# Patient Record
Sex: FEMALE | Race: WHITE | Employment: STUDENT | URBAN - METROPOLITAN AREA
[De-identification: names, ages, dates, MRNs, and addresses within clinical notes are randomized per-mention and may not be internally consistent; named-entity substitution may affect disease eponyms.]

---

## 2018-02-08 ENCOUNTER — RADIANT APPOINTMENT (OUTPATIENT)
Dept: GENERAL RADIOLOGY | Facility: CLINIC | Age: 18
End: 2018-02-08
Attending: FAMILY MEDICINE
Payer: COMMERCIAL

## 2018-02-08 ENCOUNTER — OFFICE VISIT (OUTPATIENT)
Dept: ORTHOPEDICS | Facility: CLINIC | Age: 18
End: 2018-02-08
Payer: COMMERCIAL

## 2018-02-08 VITALS
HEIGHT: 64 IN | DIASTOLIC BLOOD PRESSURE: 57 MMHG | WEIGHT: 127.6 LBS | SYSTOLIC BLOOD PRESSURE: 109 MMHG | BODY MASS INDEX: 21.78 KG/M2

## 2018-02-08 DIAGNOSIS — M25.561 ACUTE PAIN OF RIGHT KNEE: Primary | ICD-10-CM

## 2018-02-08 DIAGNOSIS — M25.561 ACUTE PAIN OF RIGHT KNEE: ICD-10-CM

## 2018-02-08 NOTE — MR AVS SNAPSHOT
After Visit Summary   2/8/2018    Kim Jenkins    MRN: [id number]           Patient Information     Date Of Birth          2000        Visit Information        Provider Department      2/8/2018 3:20 PM Alf Brown DO [facility]        Today's Diagnoses     Acute pain of right knee    -  1      Care Instructions    Anterior Knee pain    -There are many causes including trauma, history of dislocation or subluxation of knee cap but most often it is due to an imbalance of the thigh muscles or weak core muscles which cause irregular tracking of your kneecap on your thigh bone.  -People whose feet pronate (roll in) increase the outward pulling on the kneecap as well    SIGNS AND SYMPTOMS:  -Diffuse knee pain that worsens with stairs, squatting, prolonged sitting, jumping, and similar movements.  -Pain may be achy or sharp  -Stiffness with prolonged sitting  -Occasionally, giving way of the knee, grinding or a catching sensation    TREATMENT:  -regular exercise (biking, swimming, or elliptical are good for cardio)  -weight lifting/plyometrics are helpful but remember to keep your knees behind your toes (don't bend knee more than 90degrees)  -regular core exercises (yoga and pilates)  -arch supports may help during exercise until hips stronger to prevent ankle pronation  *over the counter semi-rigid brands include power step arch supports may be purchased at [employer], [employer] or internet  *custom made orthotics may be ordered by your physician if needed for prolonged treatment  -Stretching and strengthening therapy  -Ice 10-15 minutes after activity. (Ice cups for massage 5-7min)  -Ice and NSAIDs help decrease inflammation. A good anti-inflammatory NSAID medication is Alleve (220mg). Take 1-2 tabs twice daily with food until pain improves or minimum of 14 days, then as needed for pain. (1-2 tabs twice daily dosing is for  patients over 12 years old. Jose than 11 yo, check dose with doctor.)  -often component of hip weakness that leads to lower extremity (foot, ankle, shin, and knee) problems so a lot of focus will be on core strength and balance  - recommend yoga for core strengthening and stretching  -Perform exercises as instructed through handout or formal therapy if doing. Until then start with the following:  --Once released to increase activity, BE PATIENT!    Return to activity guidelines include:    If it hurts, please avoid activity    Start gradually and build up, wait 24 hours before increase intensity and time    Runnin min on treadmill (or somewhere you can get home easily from if you have to stop), start walk 4 min/run 1 min and Repeat 3 times. If pain free for 48 hours, increase to walk 3 min/run 2 min. Continue to increase as such as pain allows. If you develop pain, back off to previous pain-free level and wait 1 week before increasing again.    Follow-up in 6 weeks if not improved or sooner if further concern.    If problem flares again after resolved, restart icing, and exercises.      Standing hamstring stretch: Put the heel of the leg on your injured side on a stool about 15 inches high. Keep your leg straight. Lean forward, bending at the hips, until you feel a mild stretch in the back of your thigh. Make sure you don't roll your shoulders or bend at the waist when doing this or you will stretch your lower back instead of your leg. Hold the stretch for 15 to 30 seconds. Repeat 3 times.    Quadriceps stretch: Stand at an arm's length away from the wall with your injured side farthest from the wall. Facing straight ahead, brace yourself by keeping one hand against the wall. With your other hand, grasp the ankle on your injured side and pull your heel toward your buttocks. Don't arch or twist your back. Keep your knees together. Hold this stretch for 15 to 30 seconds.    Side-lying leg lift: Lie on your  uninjured side. Tighten the front thigh muscles on your injured leg and lift that leg 8 to 10 inches (20 to 25 centimeters) away from the other leg. Keep the leg straight and lower it slowly. Do 2 sets of 15.    Quad sets: Sit on the floor with your injured leg straight and your other leg bent. Press the back of the knee of your injured leg against the floor by tightening the muscles on the top of your thigh. Hold this position 10 seconds. Relax. Do 2 sets of 15.  Straight leg raise: Lie on your back with your legs straight out in front of you. Bend the knee on your uninjured side and place the foot flat on the floor. Tighten the thigh muscle on your injured side and lift your leg about 8 inches off the floor. Keep your leg straight and your thigh muscle tight. Slowly lower your leg back down to the floor. Do 2 sets of 15.    Clam exercise: Lie on your uninjured side with your hips and knees bent and feet together. Slowly raise your top leg toward the ceiling while keeping your heels touching each other. Hold for 2 seconds and lower slowly. Do 2 sets of 15 repetitions.    Wall squat with a ball: Stand with your back, shoulders, and head against a wall. Look straight ahead. Keep your shoulders relaxed and your feet 3 feet (90 centimeters) from the wall and shoulder's width apart. Place a soccer or basketball-sized ball behind your back. Keeping your back against the wall, slowly squat down to a 45-degree angle. Your thighs will not yet be parallel to the floor. Hold this position for 10 seconds and then slowly slide back up the wall. Repeat 10 times. Build up to 2 sets of 15.    Knee stabilization: Wrap a piece of elastic tubing around the ankle of your uninjured leg. Tie a knot in the other end of the tubing and close it in a door at about ankle height.  Stand facing the door on the leg without tubing (your injured leg) and bend your knee slightly, keeping your thigh muscles tight. Stay in this position while you  move the leg with the tubing (the uninjured leg) straight back behind you. Do 2 sets of 15.  Turn 90 degrees so the leg without tubing is closest to the door. Move the leg with tubing away from your body. Do 2 sets of 15.  Turn 90 degrees again so your back is to the door. Move the leg with tubing straight out in front of you. Do 2 sets of 15.  Turn your body 90 degrees again so the leg with tubing is closest to the door. Move the leg with tubing across your body. Do 2 sets of 15.  Hold onto a chair if you need help balancing. This exercise can be made more challenging by standing on a firm pillow or foam mat while you move the leg with tubing.    Resisted terminal knee extension: Make a loop with a piece of elastic tubing by tying a knot in both ends. Close the knot in a door at knee height. Step into the loop with your injured leg so the tubing is around the back of your knee. Lift the other foot off the ground and hold onto a chair for balance, if needed. Bend the knee with tubing about 45 degrees. Slowly straighten your leg, keeping your thigh muscle tight as you do this. Repeat 15 times. Do 2 sets of 15. If you need an easier way to do this, stand on both legs for better support while you do the exercise.    Standing calf stretch: Stand facing a wall with your hands on the wall at about eye level. Keep your injured leg back with your heel on the floor. Keep the other leg forward with the knee bent. Turn your back foot slightly inward (as if you were pigeon-toed). Slowly lean into the wall until you feel a stretch in the back of your calf. Hold the stretch for 15 to 30 seconds. Return to the starting position. Repeat 3 times. Do this exercise several times each day.    Step-up: Stand with the foot of your injured leg on a support 3 to 5 inches (8 to 13 centimeters) high --like a small step or block of wood. Keep your other foot flat on the floor. Shift your weight onto the injured leg on the support. Straighten  your injured leg as the other leg comes off the floor. Return to the starting position by bending your injured leg and slowly lowering your uninjured leg back to the floor. Do 2 sets of 15.    Iliotibial band stretch, side-bending: Cross one leg in front of the other leg and lean in the opposite direction from the front leg. Reach the arm on the side of the back leg over your head while you do this. Hold this position for 15 to 30 seconds. Return to the starting position. Repeat 3 times and then switch legs and repeat the exercise.  Developed by [employer].  Published by [employer].  Copyright  2014 [employer] and/or one of its subsidiaries. All rights reserved.                    Follow-ups after your visit        Who to contact     Please call your clinic at [phone] to:    Ask questions about your health    Make or cancel appointments    Discuss your medicines    Learn about your test results    Speak to your doctor            Additional Information About Your Visit        MyChart Information     1jiajie is an electronic gateway that provides easy, online access to your medical records. With 1jiajie, you can request a clinic appointment, read your test results, renew a prescription or communicate with your care team.     To sign up for 1jiajie visit the website at [email or web address]   You will be asked to enter the access code listed below, as well as some personal information. Please follow the directions to create your username and password.     Your access code is: [email or web address]  Expires: 2018  4:03 PM     Your access code will  in 90 days. If you need help or a new code, please contact your [facility] or call [phone] for assistance.        Care EveryWhere ID     This is your Care EveryWhere ID. This could be used by other organizations to access your [facility] medical records  [id number]        Your Vitals Were     Height BMI (Body Mass  "Index)                1.626 m (5' 4\") 21.9 kg/m2           Blood Pressure from Last 3 Encounters:   02/08/18 109/57    Weight from Last 3 Encounters:   02/08/18 57.9 kg (127 lb 9.6 oz) (51 %)*     * Growth percentiles are based on [facility] 2-20 Years data.               Primary Care Provider    None Specified       No primary provider on file.        Equal Access to Services     TRUEDesert Valley HospitalIVORY : Hadii aad ku hadasho Sojoeali, waaxda luqadaha, qaybta kaalmada adeegyada, dafne soto hayaan sam leonallyssavitaly woodn . So [facility] [phone].    ATENCIÓN: Si habla español, tiene a escobar disposición servicios gratuitos de asistencia lingüística. Llame al [phone].    We comply with applicable federal civil rights laws and [place] laws. We do not discriminate on the basis of race, color, national origin, age, disability, sex, sexual orientation, or gender identity.            Thank you!     Thank you for choosing [facility]  for your care. Our goal is always to provide you with excellent care. Hearing back from our patients is one way we can continue to improve our services. Please take a few minutes to complete the written survey that you may receive in the mail after your visit with us. Thank you!             Your Updated Medication List - Protect others around you: Learn how to safely use, store and throw away your medicines at www.disposemymeds.org.      Notice  As of 2/8/2018  4:03 PM    You have not been prescribed any medications.      "

## 2018-02-08 NOTE — PROGRESS NOTES
"CHIEF COMPLAINT:  New Patient (Right knee)       HISTORY OF PRESENT ILLNESS  Ms. Luis Fernando Jenkins is a pleasant 17 year old year old female who presents to clinic today with right knee pain.  Kim explains that she was playing ultimate frisbee about two weeks ago when she dove for [email or web address].  Landed on her right knee.  She recalls this type of trauma on two occasions during game.  Pain after this time while running.  Located medial side of right knee.  Still able to play but has continued \"soreness\".  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  Denies swelling.  Denies h/o patellar dislocation, knee injury or knee surgery.  No swelling. No locking, no giving way.    Additional history: as documented    MEDICAL HISTORY  There is no problem list on file for this patient.      No current outpatient prescriptions on file.       Allergies   Allergen Reactions     Sulfa Drugs Hives       No family history on file.    Additional medical/Social/Surgical histories reviewed in [employer] and updated as appropriate.     REVIEW OF SYSTEMS (2/8/2018)  CONSTITUTIONAL: Denies fever and weight loss  EYES: Denies acute vision changes  ENT: Denies hearing changes or difficulty swallowing  CARDIAC: Denies chest pain or edema  RESPIRATORY: Denies dyspnea, cough or wheeze  GASTROINTESTINAL: Denies abdominal pain, nausea, vomiting  MUSCULOSKELETAL: See HPI  SKIN: Denies any recent rash or lesion  NEUROLOGICAL: Denies numbness or focal weakness  PSYCHIATRIC: No history of psychiatric symptoms or problems  ENDOCRINE: Denies current diagnosis of diabetes  HEMATOLOGY: Denies episodes of easy bleeding      PHYSICAL EXAM  /57  Ht 1.626 m (5' 4\")  Wt 57.9 kg (127 lb 9.6 oz)  BMI 21.9 kg/m2    General  - normal appearance, in no obvious distress  CV  - normal popliteal pulse  Pulm  - normal respiratory pattern, non-labored  Musculoskeletal - knee  - stance: normal gait without limp, normal single leg squat, no " obvious leg length discrepancy  - inspection: no swelling or effusion, normal bone and joint alignment, no obvious deformity  - palpation: no joint line tenderness, patella and patellar tendon non-tender.  Tenderness to palpation at distal quadricep musculature overlying VMO as well as some medial femoral condyle tenderness adjacent.  - ROM: 135 degrees flexion, -5 degrees extension, not painful, normal actively and passively compared to contralateral  - strength: 5/5 in flexion, 5/5 in extension  - special tests:  (-) Lachman  (-) anterior drawer  (-) posterior drawer  (-) pivot shift  (-) Leon  (-) Thessaly  (-) varus at 0 and 30 degrees flexion  (-) valgus at 0 and 30 degrees flexion  (-) Jhoan s compression test  (-) patellar apprehension    Neuro  - no sensory or motor deficit, grossly normal coordination, normal muscle tone  Skin  - no ecchymosis, erythema, warmth, or induration, no obvious rash  Psych  - interactive, appropriate, normal mood and affect    IMAGING : XR right knee. Final results and radiologist's interpretation, available in the [facility] health record. Images were reviewed with the patient/family members in the office today. My personal interpretation of the performed imaging is no acute osseous abnormality.  Normal joint spaces.     ASSESSMENT & PLAN  Ms. Luis Fernando Jenkins is a 17 year old year old female who presents to clinic today with right knee pain and residual soreness which occurred after contacting ground with right knee x 2 weeks ago.    Diagnosis:   Right knee contusion    -HEP provided  -Continue Ice, rest  -May continue knee sleeve for perceived support  -Ibuprofen  -Activity as tolerated  -F/u 2 weeks if persisting    It was a pleasure seeing Kim today.    Alf Brown DO, CAM  Primary Care Sports Medicine

## 2018-02-08 NOTE — LETTER
Date:February 12, 2018      Patient was self referred, no letter generated. Do not send.        [facility] Information

## 2018-02-08 NOTE — LETTER
2/8/2018       RE: Kim Jenkins  [address] [place] [address]     Dear Colleague,    Thank you for referring your patient, Kim Jenkins, to the [facility] at [facility]. Please see a copy of my visit note below.          SPORTS & ORTHOPEDIC WALK-IN VISIT 2/8/2018    Primary Care Physician:       2 weeks ago dove while playing ultimate Frisbee and hit knee on the ground twice. She noticed pain while running. She explains the pain as a deep pain on the medial right knee. She notices that she is sore while playing as well as after.  She plays ultimate 4 times a weeks.     Reason for visit:     What part of your body is injured / painful?  right knee    What caused the injury /pain? Fall    How long ago did your injury occur or pain begin? 2 week(s)    What are your most bothersome symptoms? Pain    How would you characterize your symptom?  aching, dull and sharp    What makes your symptoms better? Rest, Ice and Wrap or brace    What makes your symptoms worse? Walking, Playing sport and Other: running and stairs.    Have you been previously seen for this problem? No    Medical History:    Any recent changes to your medical history? No    Any new medication prescribed since last visit? No    Have you had surgery on this body part before? No    Medical History:     Medications: None    Allergies: Yes: Sulfa Drugs     Family History of Medical Problems: NA    Previous Surgeries: NA    Social History:    Occupation: Student at      Handedness: Right    Exercise: 3-4 days/week    Review of Systems:    Do you have fever, chills, weight loss? No    Do you have any vision problems? No    Do you have any chest pain or edema? No    Do you have any shortness of breath or wheezing?  No    Do you have stomach problems? No    Do you have any numbness or focal weakness? No    Do you have diabetes? No    Do you  "have problems with bleeding or clotting? No    Do you have an rashes or other skin lesions? No           CHIEF COMPLAINT:  New Patient (Right knee)       HISTORY OF PRESENT ILLNESS  Ms. Luis Fernando Jenikns is a pleasant 17 year old year old female who presents to clinic today with right knee pain.  Kim explains that she was playing ultimate frisbee about two weeks ago when she dove for [employer].  Landed on her right knee.  She recalls this type of trauma on two occasions during game.  Pain after this time while running.  Located medial side of right knee.  Still able to play but has continued \"soreness\".  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  Denies swelling.  Denies h/o patellar dislocation, knee injury or knee surgery.  No swelling. No locking, no giving way.    Additional history: as documented    MEDICAL HISTORY  There is no problem list on file for this patient.      No current outpatient prescriptions on file.       Allergies   Allergen Reactions     Sulfa Drugs Hives       No family history on file.    Additional medical/Social/Surgical histories reviewed in [employer] and updated as appropriate.     REVIEW OF SYSTEMS (2/8/2018)  CONSTITUTIONAL: Denies fever and weight loss  EYES: Denies acute vision changes  ENT: Denies hearing changes or difficulty swallowing  CARDIAC: Denies chest pain or edema  RESPIRATORY: Denies dyspnea, cough or wheeze  GASTROINTESTINAL: Denies abdominal pain, nausea, vomiting  MUSCULOSKELETAL: See HPI  SKIN: Denies any recent rash or lesion  NEUROLOGICAL: Denies numbness or focal weakness  PSYCHIATRIC: No history of psychiatric symptoms or problems  ENDOCRINE: Denies current diagnosis of diabetes  HEMATOLOGY: Denies episodes of easy bleeding      PHYSICAL EXAM  /57  Ht 1.626 m (5' 4\")  Wt 57.9 kg (127 lb 9.6 oz)  BMI 21.9 kg/m2    General  - normal appearance, in no obvious distress  CV  - normal popliteal pulse  Pulm  - normal respiratory " pattern, non-labored  Musculoskeletal - knee  - stance: normal gait without limp, normal single leg squat, no obvious leg length discrepancy  - inspection: no swelling or effusion, normal bone and joint alignment, no obvious deformity  - palpation: no joint line tenderness, patella and patellar tendon non-tender.  Tenderness to palpation at distal quadricep musculature overlying VMO as well as some medial femoral condyle tenderness adjacent.  - ROM: 135 degrees flexion, -5 degrees extension, not painful, normal actively and passively compared to contralateral  - strength: 5/5 in flexion, 5/5 in extension  - special tests:  (-) Lachman  (-) anterior drawer  (-) posterior drawer  (-) pivot shift  (-) Leon  (-) Thessaly  (-) varus at 0 and 30 degrees flexion  (-) valgus at 0 and 30 degrees flexion  (-) Jhoan s compression test  (-) patellar apprehension    Neuro  - no sensory or motor deficit, grossly normal coordination, normal muscle tone  Skin  - no ecchymosis, erythema, warmth, or induration, no obvious rash  Psych  - interactive, appropriate, normal mood and affect    IMAGING : XR right knee. Final results and radiologist's interpretation, available in the [facility] health record. Images were reviewed with the patient/family members in the office today. My personal interpretation of the performed imaging is no acute osseous abnormality.  Normal joint spaces.     ASSESSMENT & PLAN  Ms. Luis Fernando Jenkins is a 17 year old year old female who presents to clinic today with right knee pain and residual soreness which occurred after contacting ground with right knee x 2 weeks ago.    Diagnosis:   Right knee contusion    -HEP provided  -Continue Ice, rest  -May continue knee sleeve for perceived support  -Ibuprofen  -Activity as tolerated  -F/u 2 weeks if persisting    It was a pleasure seeing Kim today.    Alf Brown, DO, CAM  Primary Care Sports Medicine      Again, thank you for allowing me to participate in the  care of your patient.      Sincerely,    Alf Brown, DO

## 2018-02-08 NOTE — PATIENT INSTRUCTIONS
Anterior Knee pain    -There are many causes including trauma, history of dislocation or subluxation of knee cap but most often it is due to an imbalance of the thigh muscles or weak core muscles which cause irregular tracking of your kneecap on your thigh bone.  -People whose feet pronate (roll in) increase the outward pulling on the kneecap as well    SIGNS AND SYMPTOMS:  -Diffuse knee pain that worsens with stairs, squatting, prolonged sitting, jumping, and similar movements.  -Pain may be achy or sharp  -Stiffness with prolonged sitting  -Occasionally, giving way of the knee, grinding or a catching sensation    TREATMENT:  -regular exercise (biking, swimming, or elliptical are good for cardio)  -weight lifting/plyometrics are helpful but remember to keep your knees behind your toes (don't bend knee more than 90degrees)  -regular core exercises (yoga and pilates)  -arch supports may help during exercise until hips stronger to prevent ankle pronation  *over the counter semi-rigid brands include power step arch supports may be purchased at specialty shoe stores, [employer] or internet  *custom made orthotics may be ordered by your physician if needed for prolonged treatment  -Stretching and strengthening therapy  -Ice 10-15 minutes after activity. (Ice cups for massage 5-7min)  -Ice and NSAIDs help decrease inflammation. A good anti-inflammatory NSAID medication is Alleve (220mg). Take 1-2 tabs twice daily with food until pain improves or minimum of 14 days, then as needed for pain. (1-2 tabs twice daily dosing is for patients over 12 years old. Jose than 13 yo, check dose with doctor.)  -often component of hip weakness that leads to lower extremity (foot, ankle, shin, and knee) problems so a lot of focus will be on core strength and balance  - recommend yoga for core strengthening and stretching  -Perform exercises as instructed through handout or formal therapy if doing. Until then start with the  following:  --Once released to increase activity, BE PATIENT!    Return to activity guidelines include:    If it hurts, please avoid activity    Start gradually and build up, wait 24 hours before increase intensity and time    Runnin min on treadmill (or somewhere you can get home easily from if you have to stop), start walk 4 min/run 1 min and Repeat 3 times. If pain free for 48 hours, increase to walk 3 min/run 2 min. Continue to increase as such as pain allows. If you develop pain, back off to previous pain-free level and wait 1 week before increasing again.    Follow-up in 6 weeks if not improved or sooner if further concern.    If problem flares again after resolved, restart icing, and exercises.      Standing hamstring stretch: Put the heel of the leg on your injured side on a stool about 15 inches high. Keep your leg straight. Lean forward, bending at the hips, until you feel a mild stretch in the back of your thigh. Make sure you don't roll your shoulders or bend at the waist when doing this or you will stretch your lower back instead of your leg. Hold the stretch for 15 to 30 seconds. Repeat 3 times.    Quadriceps stretch: Stand at an arm's length away from the wall with your injured side farthest from the wall. Facing straight ahead, brace yourself by keeping one hand against the wall. With your other hand, grasp the ankle on your injured side and pull your heel toward your buttocks. Don't arch or twist your back. Keep your knees together. Hold this stretch for 15 to 30 seconds.    Side-lying leg lift: Lie on your uninjured side. Tighten the front thigh muscles on your injured leg and lift that leg 8 to 10 inches (20 to 25 centimeters) away from the other leg. Keep the leg straight and lower it slowly. Do 2 sets of 15.    Quad sets: Sit on the floor with your injured leg straight and your other leg bent. Press the back of the knee of your injured leg against the floor by tightening the muscles on the  top of your thigh. Hold this position 10 seconds. Relax. Do 2 sets of 15.  Straight leg raise: Lie on your back with your legs straight out in front of you. Bend the knee on your uninjured side and place the foot flat on the floor. Tighten the thigh muscle on your injured side and lift your leg about 8 inches off the floor. Keep your leg straight and your thigh muscle tight. Slowly lower your leg back down to the floor. Do 2 sets of 15.    Clam exercise: Lie on your uninjured side with your hips and knees bent and feet together. Slowly raise your top leg toward the ceiling while keeping your heels touching each other. Hold for 2 seconds and lower slowly. Do 2 sets of 15 repetitions.    Wall squat with a ball: Stand with your back, shoulders, and head against a wall. Look straight ahead. Keep your shoulders relaxed and your feet 3 feet (90 centimeters) from the wall and shoulder's width apart. Place a soccer or basketball-sized ball behind your back. Keeping your back against the wall, slowly squat down to a 45-degree angle. Your thighs will not yet be parallel to the floor. Hold this position for 10 seconds and then slowly slide back up the wall. Repeat 10 times. Build up to 2 sets of 15.    Knee stabilization: Wrap a piece of elastic tubing around the ankle of your uninjured leg. Tie a knot in the other end of the tubing and close it in a door at about ankle height.  Stand facing the door on the leg without tubing (your injured leg) and bend your knee slightly, keeping your thigh muscles tight. Stay in this position while you move the leg with the tubing (the uninjured leg) straight back behind you. Do 2 sets of 15.  Turn 90 degrees so the leg without tubing is closest to the door. Move the leg with tubing away from your body. Do 2 sets of 15.  Turn 90 degrees again so your back is to the door. Move the leg with tubing straight out in front of you. Do 2 sets of 15.  Turn your body 90 degrees again so the leg with  tubing is closest to the door. Move the leg with tubing across your body. Do 2 sets of 15.  Hold onto a chair if you need help balancing. This exercise can be made more challenging by standing on a firm pillow or foam mat while you move the leg with tubing.    Resisted terminal knee extension: Make a loop with a piece of elastic tubing by tying a knot in both ends. Close the knot in a door at knee height. Step into the loop with your injured leg so the tubing is around the back of your knee. Lift the other foot off the ground and hold onto a chair for balance, if needed. Bend the knee with tubing about 45 degrees. Slowly straighten your leg, keeping your thigh muscle tight as you do this. Repeat 15 times. Do 2 sets of 15. If you need an easier way to do this, stand on both legs for better support while you do the exercise.    Standing calf stretch: Stand facing a wall with your hands on the wall at about eye level. Keep your injured leg back with your heel on the floor. Keep the other leg forward with the knee bent. Turn your back foot slightly inward (as if you were pigeon-toed). Slowly lean into the wall until you feel a stretch in the back of your calf. Hold the stretch for 15 to 30 seconds. Return to the starting position. Repeat 3 times. Do this exercise several times each day.    Step-up: Stand with the foot of your injured leg on a support 3 to 5 inches (8 to 13 centimeters) high --like a small step or block of wood. Keep your other foot flat on the floor. Shift your weight onto the injured leg on the support. Straighten your injured leg as the other leg comes off the floor. Return to the starting position by bending your injured leg and slowly lowering your uninjured leg back to the floor. Do 2 sets of 15.    Iliotibial band stretch, side-bending: Cross one leg in front of the other leg and lean in the opposite direction from the front leg. Reach the arm on the side of the back leg over your head while you  do this. Hold this position for 15 to 30 seconds. Return to the starting position. Repeat 3 times and then switch legs and repeat the exercise.  Developed by [employer].  Published by [employer].  Copyright  2014 [employer] and/or one of its subsidiaries. All rights reserved.

## 2018-02-08 NOTE — PROGRESS NOTES
SPORTS & ORTHOPEDIC WALK-IN VISIT 2/8/2018    Primary Care Physician:       2 weeks ago dove while playing ultimate Frisbee and hit knee on the ground twice. She noticed pain while running. She explains the pain as a deep pain on the medial right knee. She notices that she is sore while playing as well as after.  She plays ultimate 4 times a weeks.     Reason for visit:     What part of your body is injured / painful?  right knee    What caused the injury /pain? Fall    How long ago did your injury occur or pain begin? 2 week(s)    What are your most bothersome symptoms? Pain    How would you characterize your symptom?  aching, dull and sharp    What makes your symptoms better? Rest, Ice and Wrap or brace    What makes your symptoms worse? Walking, Playing sport and Other: running and stairs.    Have you been previously seen for this problem? No    Medical History:    Any recent changes to your medical history? No    Any new medication prescribed since last visit? No    Have you had surgery on this body part before? No    Medical History:     Medications: None    Allergies: Yes: Sulfa Drugs     Family History of Medical Problems: NA    Previous Surgeries: NA    Social History:    Occupation: Student at [employer]     Handedness: Right    Exercise: 3-4 days/week    Review of Systems:    Do you have fever, chills, weight loss? No    Do you have any vision problems? No    Do you have any chest pain or edema? No    Do you have any shortness of breath or wheezing?  No    Do you have stomach problems? No    Do you have any numbness or focal weakness? No    Do you have diabetes? No    Do you have problems with bleeding or clotting? No    Do you have an rashes or other skin lesions? No

## 2018-09-12 ENCOUNTER — OFFICE VISIT (OUTPATIENT)
Dept: ORTHOPEDICS | Facility: CLINIC | Age: 18
End: 2018-09-12
Payer: COMMERCIAL

## 2018-09-12 VITALS
DIASTOLIC BLOOD PRESSURE: 62 MMHG | BODY MASS INDEX: 21.34 KG/M2 | SYSTOLIC BLOOD PRESSURE: 110 MMHG | WEIGHT: 125 LBS | HEIGHT: 64 IN

## 2018-09-12 DIAGNOSIS — S29.012A RHOMBOID MUSCLE STRAIN, INITIAL ENCOUNTER: Primary | ICD-10-CM

## 2018-09-12 RX ORDER — LEVONORGESTREL AND ETHINYL ESTRADIOL 0.1-0.02MG
KIT ORAL
COMMUNITY
Start: 2018-08-02

## 2018-09-12 RX ORDER — LEVONORGESTREL 1.5 MG/1
TABLET ORAL
COMMUNITY
Start: 2018-08-02

## 2018-09-12 RX ORDER — 1.1% SODIUM FLUORIDE PRESCRIPTION DENTAL CREAM 5 MG/G
CREAM DENTAL
COMMUNITY
Start: 2018-07-13

## 2018-09-12 RX ORDER — DICLOFENAC SODIUM 75 MG/1
75 TABLET, DELAYED RELEASE ORAL 2 TIMES DAILY
Qty: 20 TABLET | Refills: 0 | Status: SHIPPED | OUTPATIENT
Start: 2018-09-12

## 2018-09-12 NOTE — PROGRESS NOTES
"[facility]  Orthopedics  Alf rBown, DO  2018     Name: Kim Jenkins  MRN: [id number]  Age: 17 year old  : 2000  Referring provider: Referred Self     Chief Complaint: Shoulder and upper back pain    Date of Injury: 18    History of Present Illness:   Kim Jenkins is a 17 year old, right handed female who presents today for evaluation of her right shoulder and upper back pain. On 18, the patient woke up and felt an achy, muscle pain in her right shoulder blade. The pain occurs when siting, standing, turning a certain way, or when she is practicing [employer]. The pain has increased since then, and usually gets worse as the day goes on. She has tried massages, Biofreeze, rest, and heat to the area with some relief. She notes that it was her first night in a new bed, but does not recall any incident that would have caused her pain.    Review of Systems:   A 10-point review of systems was obtained and is negative except for as noted in the HPI.     Medications:      AUBRA 0.1-20 MG-MCG per tablet, , Disp: , Rfl:      SF 5000 PLUS 1.1 % CREA, , Disp: , Rfl:      ECONTRA EZ 1.5 MG TABS tablet, , Disp: , Rfl:     Allergies:  Sulfa drugs (hives)    Past Medical History:  The patient denies any significant past medical history.    Past Surgical History:  The patient does not have any pertinent past surgical history.    Social History:  Patient is a student and enjoys playing ultimate frisbee. She denies tobacco use and denies alcohol use.     Family History:  No past pertinent family history.    Physical Examination:  Blood pressure 110/62, height 1.626 m (5' 4\"), weight 56.7 kg (125 lb).  General  - normal appearance, in no obvious distress  CV  - normal radial pulse  Pulm  - normal respiratory pattern, non-labored  Musculoskeletal - shoulder  - inspection: hypertonicity of right trapezius muscle  - palpation: tenderness at right rhomboid, scapular border, mid belly, " and ropy-ness of right rhomboid  - ROM: terminal elevation pain  - strength: 5/5  strength, 5/5 in all shoulder planes  - special tests:  (-) Speed's  (-) Neer  (-) Hawkin's  (+) Misty's with pain isolated to rhomboid areas  (-) Chicot's  (-) apprehension  (-) subscap lift-off  Neuro  - no sensory or motor deficit, grossly normal coordination, normal muscle tone  Skin  - no ecchymosis, erythema, warmth, or induration, no obvious rash  Psych  - interactive, appropriate, normal mood and affect    Assessment:   17 year old, right 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 Clinical exam consistent with right rhomboid strain.      Plan:   - Tizanidine at bedtime   - Ibuprofen twice a day  - HEP provided, tennis ball massage  - Ice the area after Frisbee practice, Stretch /heat prior  - Follow up 3-4 weeks if persisting.    It was a pleasure seeing Kim today.    Alf Brown DO, [facility]  Primary Care Sports Medicine    I, Alf Brown DO, have reviewed the above note and agree with the scribe's notation as written.    Scribe Disclosure:   Murtaza CROSS, am serving as a scribe to document services personally performed by Alf Brown DO at this visit, based upon the provider's statements to me. All documentation has been reviewed by the aforementioned provider prior to being entered into the official medical record.

## 2018-09-12 NOTE — PROGRESS NOTES
SPORTS & ORTHOPEDIC WALK-IN VISIT 9/12/2018    Primary Care Physician:      Woke up last Tuesday and had pain in her right shoulder blade. The pain has increased now. The pain is worse when she is standing and sitting. The pain gets worse as the day goes on. She has been using a massage tool, heat, and biofreeze which helps temporarily helps. She notes it was her first night in a new bed, but does not recall any incident that would cause it.     Reason for visit:     What part of your body is injured / painful?  left upper back    What caused the injury /pain? No inciting event     How long ago did your injury occur or pain begin? several days ago    What are your most bothersome symptoms? Pain    How would you characterize your symptom?  aching and muscle pain     What makes your symptoms better? Rest, Heat and Other: biofreeze    What makes your symptoms worse? Standing and Sitting    Have you been previously seen for this problem? No    Medical History:    Any recent changes to your medical history? No    Any new medication prescribed since last visit? No    Have you had surgery on this body part before? No    Social History:    Occupation: Student     Handedness: Right    Exercise: Most days/week    Review of Systems:    Do you have fever, chills, weight loss? No    Do you have any vision problems? No    Do you have any chest pain or edema? No    Do you have any shortness of breath or wheezing?  No    Do you have stomach problems? No    Do you have any numbness or focal weakness? No    Do you have diabetes? No    Do you have problems with bleeding or clotting? No    Do you have an rashes or other skin lesions? No

## 2018-09-12 NOTE — PATIENT INSTRUCTIONS
Rhomboid Strain    WHAT IS A RHOMBOID STRAIN OR SPASM?    The rhomboid muscles in your upper back connect the inner edges of your shoulder blades to your spine. A rhomboid strain is a stretch or tear of these muscles. A rhomboid spasm is a sudden tightening of the muscle that you cannot control.    WHAT IS THE CAUSE?    A rhomboid muscle strain or spasm is usually caused by overuse of your shoulder and arm. This can happen from:    Overhead activities, like serving a tennis ball or reaching to put objects on a high shelf  Rowing  Carrying a heavy backpack, especially if you carry it over just one shoulder  Poor posture, especially while you are using a computer for a long time    WHAT ARE THE SYMPTOMS?    A strain causes pain in the upper back between your shoulder blade and your spine. A spasm feels like a knot or tightness in the muscle. You may have pain when you move your shoulders or when you breathe.    HOW IS IT DIAGNOSED?    Your healthcare provider will ask about your symptoms, activities, and medical history and examine you.    HOW IS IT TREATED?    You will need to change or stop doing the activities that cause pain until your muscles have healed. For example, you may need to run or ride a bicycle instead of playing tennis or rowing.    Your healthcare provider may recommend stretching and strengthening exercises and other types of physical therapy to help you heal.    A mild rhomboid strain may heal within a few weeks, but a severe injury may take 6 weeks or longer.    HOW CAN I HELP TAKE CARE OF MYSELF?    To help relieve swelling and pain:    Put an ice pack, gel pack, or package of frozen vegetables wrapped in a cloth on the injured area every 3 to 4 hours for up to 20 minutes at a time. You can lie down with your upper back against the ice.    Take pain medicine, such as acetaminophen, ibuprofen, or other medicine as directed by your provider. Nonsteroidal anti-inflammatory medicines (NSAIDs), such  as ibuprofen, may cause stomach bleeding and other problems. These risks increase with age. Read the label and take as directed. Unless recommended by your healthcare provider, do not take for more than 10 days.    Put moist heat on your back for up to 20 minutes at a time to help relax tight muscles or muscle spasms. Moist heat includes heat patches or moist heating pads that you can purchase at most drugstores, a wet washcloth or towel that has been heated in the dryer, or a hot shower. Don t use heat if you have swelling.    Do the exercises recommended by your healthcare provider. Massage is also very helpful. Here s a way to do a form of self-massage:    Put a tennis ball on the floor and lie down with your upper back against the ball.  Shift your position to gently roll the ball against your muscles.  You can also buy a foam roller or a self-massage tool.    Follow your healthcare provider's instructions. Ask your provider:    How and when you will hear your test results  How long it will take to recover  What activities you should avoid and when you can return to your normal activities  How to take care of yourself at home  What symptoms or problems you should watch for and what to do if you have them  Make sure you know when you should come back for a checkup.    HOW CAN I HELP PREVENT RHOMBOID MUSCLE STRAIN OR SPASM?    Here are some of the things you can do to help prevent rhomboid muscle strain or spasm:    Do warm-up exercises and stretching before activities to help prevent injuries.  When you work at a computer, take frequent breaks to stretch your neck and back.  Follow safety rules and use any protective equipment recommended for your work or sport. Practice good posture and good form!    Developed by [employer].  Published by [employer].  Copyright  2014 [employer] and/or one of its subsidiaries. All rights reserved.                            Rhomboid Strain Exercises    You may do all  of these exercises right away.    Pectoralis stretch:  an open doorway or corner with both hands slightly above your head on the door frame or wall. Slowly lean forward until you feel a stretch in the front of your shoulders. Hold 15 to 30 seconds. Repeat 3 times.  Thoracic extension: Sit in a chair and clasp both arms behind your head. Gently arch backward and look up toward the ceiling. Repeat 10 times. Do this several times each day.    Arm slide on wall: Sit or stand with your back against a wall and your elbows and wrists against the wall. Slowly slide your arms upward as high as you can while keeping your elbows and wrists against the wall. Do 2 sets of 8 to 12.  Scapular squeeze: While sitting or standing with your arms by your sides, squeeze your shoulder blades together and hold for 5 seconds. Do 2 sets of 15.    Mid-trap exercise: Lie on your stomach on a firm surface and place a folded pillow underneath your chest. Place your arms out straight to your sides with your elbows straight and thumbs toward the ceiling. Slowly raise your arms toward the ceiling as you squeeze your shoulder blades together. Lower slowly. Do 3 sets of 15. As the exercise gets easier to do, hold soup cans or small weights in your hands.    Thoracic stretch: Sit on the floor with your legs out straight in front of you. Hold your mid-thighs with your hands. Curl you head and neck toward your belly button. Hold for a count of 15. Repeat 3 times.    Thoracic side stretch: Sit on the floor with your legs out straight in front of you. To stretch your right upper back, point your right elbow and shoulder forward while twisting your trunk to the left. Hold for a count of 15. Repeat 3 times. To stretch your left upper back, point your left elbow and shoulder forward while twisting your trunk to the right. Hold for a count of 15. Repeat 3 times.    Rowing exercise: Close middle of elastic tubing in a door or wrap tubing around an  immovable object. Hold 1 end in each hand. Sit in a chair, bend your arms 90 degrees, and hold one end of the tubing in each hand. Keep your forearms vertical and your elbows at shoulder level and bent 90 degrees. Pull backward on the band and squeeze your shoulder blades together. Do 2 sets of 15.

## 2018-09-12 NOTE — MR AVS SNAPSHOT
After Visit Summary   9/12/2018    Kim Jenkins    MRN: [id number]           Patient Information     Date Of Birth          2000        Visit Information        Provider Department      9/12/2018 1:00 PM Alf Brown [facility]        Today's Diagnoses     Rhomboid muscle strain, initial encounter    -  1      Care Instructions    Rhomboid Strain    WHAT IS A RHOMBOID STRAIN OR SPASM?    The rhomboid muscles in your upper back connect the inner edges of your shoulder blades to your spine. A rhomboid strain is a stretch or tear of these muscles. A rhomboid spasm is a sudden tightening of the muscle that you cannot control.    WHAT IS THE CAUSE?    A rhomboid muscle strain or spasm is usually caused by overuse of your shoulder and arm. This can happen from:    Overhead activities, like serving a tennis ball or reaching to put objects on a high shelf  Rowing  Carrying a heavy backpack, especially if you carry it over just one shoulder  Poor posture, especially while you are using a computer for a long time    WHAT ARE THE SYMPTOMS?    A strain causes pain in the upper back between your shoulder blade and your spine. A spasm feels like a knot or tightness in the muscle. You may have pain when you move your shoulders or when you breathe.    HOW IS IT DIAGNOSED?    Your healthcare provider will ask about your symptoms, activities, and medical history and examine you.    HOW IS IT TREATED?    You will need to change or stop doing the activities that cause pain until your muscles have healed. For example, you may need to run or ride a bicycle instead of playing tennis or rowing.    Your healthcare provider may recommend stretching and strengthening exercises and other types of physical therapy to help you heal.    A mild rhomboid strain may heal within a few weeks, but a severe injury may take 6 weeks or longer.    HOW CAN I HELP TAKE CARE OF  MYSELF?    To help relieve swelling and pain:    Put an ice pack, gel pack, or package of frozen vegetables wrapped in a cloth on the injured area every 3 to 4 hours for up to 20 minutes at a time. You can lie down with your upper back against the ice.    Take pain medicine, such as acetaminophen, ibuprofen, or other medicine as directed by your provider. Nonsteroidal anti-inflammatory medicines (NSAIDs), such as ibuprofen, may cause stomach bleeding and other problems. These risks increase with age. Read the label and take as directed. Unless recommended by your healthcare provider, do not take for more than 10 days.    Put moist heat on your back for up to 20 minutes at a time to help relax tight muscles or muscle spasms. Moist heat includes heat patches or moist heating pads that you can purchase at most drugstores, a wet washcloth or towel that has been heated in the dryer, or a hot shower. Don t use heat if you have swelling.    Do the exercises recommended by your healthcare provider. Massage is also very helpful. Here s a way to do a form of self-massage:    Put a tennis ball on the floor and lie down with your upper back against the ball.  Shift your position to gently roll the ball against your muscles.  You can also buy a foam roller or a self-massage tool.    Follow your healthcare provider's instructions. Ask your provider:    How and when you will hear your test results  How long it will take to recover  What activities you should avoid and when you can return to your normal activities  How to take care of yourself at home  What symptoms or problems you should watch for and what to do if you have them  Make sure you know when you should come back for a checkup.    HOW CAN I HELP PREVENT RHOMBOID MUSCLE STRAIN OR SPASM?    Here are some of the things you can do to help prevent rhomboid muscle strain or spasm:    Do warm-up exercises and stretching before activities to help prevent injuries.  When you  work at a computer, take frequent breaks to stretch your neck and back.  Follow safety rules and use any protective equipment recommended for your work or sport. Practice good posture and good form!    Developed by [employer].  Published by [employer].  Copyright  2014 [employer] and/or one of its subsidiaries. All rights reserved.                            Rhomboid Strain Exercises    You may do all of these exercises right away.    Pectoralis stretch:  an open doorway or corner with both hands slightly above your head on the door frame or wall. Slowly lean forward until you feel a stretch in the front of your shoulders. Hold 15 to 30 seconds. Repeat 3 times.  Thoracic extension: Sit in a chair and clasp both arms behind your head. Gently arch backward and look up toward the ceiling. Repeat 10 times. Do this several times each day.    Arm slide on wall: Sit or stand with your back against a wall and your elbows and wrists against the wall. Slowly slide your arms upward as high as you can while keeping your elbows and wrists against the wall. Do 2 sets of 8 to 12.  Scapular squeeze: While sitting or standing with your arms by your sides, squeeze your shoulder blades together and hold for 5 seconds. Do 2 sets of 15.    Mid-trap exercise: Lie on your stomach on a firm surface and place a folded pillow underneath your chest. Place your arms out straight to your sides with your elbows straight and thumbs toward the ceiling. Slowly raise your arms toward the ceiling as you squeeze your shoulder blades together. Lower slowly. Do 3 sets of 15. As the exercise gets easier to do, hold soup cans or small weights in your hands.    Thoracic stretch: Sit on the floor with your legs out straight in front of you. Hold your mid-thighs with your hands. Curl you head and neck toward your belly button. Hold for a count of 15. Repeat 3 times.    Thoracic side stretch: Sit on the floor with your legs out straight in  "front of you. To stretch your right upper back, point your right elbow and shoulder forward while twisting your trunk to the left. Hold for a count of 15. Repeat 3 times. To stretch your left upper back, point your left elbow and shoulder forward while twisting your trunk to the right. Hold for a count of 15. Repeat 3 times.    Rowing exercise: Close middle of elastic tubing in a door or wrap tubing around an immovable object. Hold 1 end in each hand. Sit in a chair, bend your arms 90 degrees, and hold one end of the tubing in each hand. Keep your forearms vertical and your elbows at shoulder level and bent 90 degrees. Pull backward on the band and squeeze your shoulder blades together. Do 2 sets of 15.                    Follow-ups after your visit        Who to contact     Please call your clinic at [phone] to:    Ask questions about your health    Make or cancel appointments    Discuss your medicines    Learn about your test results    Speak to your doctor            Additional Information About Your Visit        Care EveryWhere ID     This is your Care EveryWhere ID. This could be used by other organizations to access your [facility] medical records  [id number]        Your Vitals Were     Height BMI (Body Mass Index)                1.626 m (5' 4\") 21.46 kg/m2           Blood Pressure from Last 3 Encounters:   09/12/18 110/62   02/08/18 109/57    Weight from Last 3 Encounters:   09/12/18 56.7 kg (125 lb) (44 %)*   02/08/18 57.9 kg (127 lb 9.6 oz) (51 %)*     * Growth percentiles are based on CDC 2-20 Years data.              Today, you had the following     No orders found for display         Today's Medication Changes          These changes are accurate as of 9/12/18  1:34 PM.  If you have any questions, ask your nurse or doctor.               Start taking these medicines.        Dose/Directions    diclofenac 75 MG EC tablet   Commonly known as:  VOLTAREN   Used for:  Rhomboid muscle strain, initial " encounter   Started by:  Alf Brown DO        Dose:  75 mg   Take 1 tablet (75 mg) by mouth 2 times daily   Quantity:  20 tablet   Refills:  0       tiZANidine 4 MG tablet   Commonly known as:  ZANAFLEX   Used for:  Rhomboid muscle strain, initial encounter   Started by:  Alf Brown DO        Dose:  4 mg   Take 1 tablet (4 mg) by mouth At Bedtime   Quantity:  10 tablet   Refills:  0            Where to get your medicines      These medications were sent to [facility] [phone]  [id number] [address]    Hours:  TRANSPLANT PHONE NUMBER [phone] Phone:  [phone]     diclofenac 75 MG EC tablet    tiZANidine 4 MG tablet                Primary Care Provider    None Specified       No primary provider on file.        Equal Access to Services     SARAH AVITIA : Rahel Rebolledo, china gonzalez, ron boggs, dafne elizabeth . So [facility] [phone].    ATENCIÓN: Si habla español, tiene a escobar disposición servicios gratuitos de asistencia lingüística. Llame al [phone].    We comply with applicable federal civil rights laws and [place] laws. We do not discriminate on the basis of race, color, national origin, age, disability, sex, sexual orientation, or gender identity.            Thank you!     Thank you for choosing [facility]  for your care. Our goal is always to provide you with excellent care. Hearing back from our patients is one way we can continue to improve our services. Please take a few minutes to complete the written survey that you may receive in the mail after your visit with us. Thank you!             Your Updated Medication List - Protect others around you: Learn how to safely use, store and throw away your medicines at www.disposemymeds.org.          This list is accurate as of 9/12/18  1:34 PM.  Always use your  most recent med list.                   Brand Name Dispense Instructions for use Diagnosis    AUBRA 0.1-20 MG-MCG per tablet   Generic drug:  levonorgestrel-ethinyl estradiol           diclofenac 75 MG EC tablet    VOLTAREN    20 tablet    Take 1 tablet (75 mg) by mouth 2 times daily    Rhomboid muscle strain, initial encounter       ECONTRA EZ 1.5 MG Tabs tablet   Generic drug:  levonorgestrel           SF 5000 PLUS 1.1 % Crea   Generic drug:  Sodium Fluoride           tiZANidine 4 MG tablet    ZANAFLEX    10 tablet    Take 1 tablet (4 mg) by mouth At Bedtime    Rhomboid muscle strain, initial encounter

## 2018-09-12 NOTE — LETTER
9/12/2018       RE: Kim Jenkins  [address] [place] [address]     Dear Colleague,    Thank you for referring your patient, Kim Jenkins, to the [facility] at [facility]. Please see a copy of my visit note below.          SPORTS & ORTHOPEDIC WALK-IN VISIT 9/12/2018    Primary Care Physician:      Woke up last Tuesday and had pain in her right shoulder blade. The pain has increased now. The pain is worse when she is standing and sitting. The pain gets worse as the day goes on. She has been using a massage tool, heat, and biofreeze which helps temporarily helps. She notes it was her first night in a new bed, but does not recall any incident that would cause it.     Reason for visit:     What part of your body is injured / painful?  left upper back    What caused the injury /pain? No inciting event     How long ago did your injury occur or pain begin? several days ago    What are your most bothersome symptoms? Pain    How would you characterize your symptom?  aching and muscle pain     What makes your symptoms better? Rest, Heat and Other: biofreeze    What makes your symptoms worse? Standing and Sitting    Have you been previously seen for this problem? No    Medical History:    Any recent changes to your medical history? No    Any new medication prescribed since last visit? No    Have you had surgery on this body part before? No    Social History:    Occupation: Student     Handedness: Right    Exercise: Most days/week    Review of Systems:    Do you have fever, chills, weight loss? No    Do you have any vision problems? No    Do you have any chest pain or edema? No    Do you have any shortness of breath or wheezing?  No    Do you have stomach problems? No    Do you have any numbness or focal weakness? No    Do you have diabetes? No    Do you have problems with bleeding or clotting? No    Do you  "have an rashes or other skin lesions? No           [facility]  Orthopedics  Alf Brown, DO  2018     Name: Kim Jenkins  MRN: [id number]  Age: 17 year old  : 2000  Referring provider: Referred Self     Chief Complaint: Shoulder and upper back pain    Date of Injury: 18    History of Present Illness:   Kim Jenkins is a 17 year old, right handed female who presents today for evaluation of her right shoulder and upper back pain. On 18, the patient woke up and felt an achy, muscle pain in her right shoulder blade. The pain occurs when siting, standing, turning a certain way, or when she is practicing [employer]. The pain has increased since then, and usually gets worse as the day goes on. She has tried massages, Biofreeze, rest, and heat to the area with some relief. She notes that it was her first night in a new bed, but does not recall any incident that would have caused her pain.    Review of Systems:   A 10-point review of systems was obtained and is negative except for as noted in the HPI.     Medications:      AUBRA 0.1-20 MG-MCG per tablet, , Disp: , Rfl:      SF 5000 PLUS 1.1 % CREA, , Disp: , Rfl:      ECONTRA EZ 1.5 MG TABS tablet, , Disp: , Rfl:     Allergies:  Sulfa drugs (hives)    Past Medical History:  The patient denies any significant past medical history.    Past Surgical History:  The patient does not have any pertinent past surgical history.    Social History:  Patient is a student and enjoys playing ultimate frisbee. She denies tobacco use and denies alcohol use.     Family History:  No past pertinent family history.    Physical Examination:  Blood pressure 110/62, height 1.626 m (5' 4\"), weight 56.7 kg (125 lb).  General  - normal appearance, in no obvious distress  CV  - normal radial pulse  Pulm  - normal respiratory pattern, non-labored  Musculoskeletal - shoulder  - inspection: hypertonicity of right trapezius muscle  - palpation: " tenderness at right rhomboid, scapular border, mid belly, and ropy-ness of right rhomboid  - ROM: terminal elevation pain  - strength: 5/5  strength, 5/5 in all shoulder planes  - special tests:  (-) Speed's  (-) Neer  (-) Hawkin's  (+) Misty's with pain isolated to rhomboid areas  (-) [place]'s  (-) apprehension  (-) subscap lift-off  Neuro  - no sensory or motor deficit, grossly normal coordination, normal muscle tone  Skin  - no ecchymosis, erythema, warmth, or induration, no obvious rash  Psych  - interactive, appropriate, normal mood and affect    Assessment:   17 year old, right 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 Clinical exam consistent with right rhomboid strain.      Plan:   - Tizanidine at bedtime   - Ibuprofen twice a day  - HEP provided, tennis ball massage  - Ice the area after Frisbee practice, Stretch /heat prior  - Follow up 3-4 weeks if persisting.    It was a pleasure seeing Kim today.    Alf Brown DO, [facility]  Primary Care Sports Medicine    I, Alf Brown DO, have reviewed the above note and agree with the scribe's notation as written.    Scribe Disclosure:   I, Murtaza Bal, am serving as a scribe to document services personally performed by Alf Brown DO at this visit, based upon the provider's statements to me. All documentation has been reviewed by the aforementioned provider prior to being entered into the official medical record.      Again, thank you for allowing me to participate in the care of your patient.      Sincerely,    Alf Brown DO

## 2018-09-12 NOTE — LETTER
Date:September 17, 2018      Patient was self referred, no letter generated. Do not send.        [facility] Health Information

## 2019-03-29 ENCOUNTER — OFFICE VISIT (OUTPATIENT)
Dept: ORTHOPEDICS | Facility: CLINIC | Age: 19
End: 2019-03-29
Payer: COMMERCIAL

## 2019-03-29 VITALS — RESPIRATION RATE: 16 BRPM | HEIGHT: 64 IN | BODY MASS INDEX: 21.46 KG/M2

## 2019-03-29 DIAGNOSIS — M25.562 ACUTE PAIN OF LEFT KNEE: Primary | ICD-10-CM

## 2019-03-29 RX ORDER — LEVONORGESTREL AND ETHINYL ESTRADIOL 0.15-0.03
KIT ORAL
COMMUNITY
Start: 2019-02-25

## 2019-03-29 NOTE — PROGRESS NOTES
SPORTS & ORTHOPEDIC WALK-IN VISIT 3/29/2019    Primary Care Physician:      Played ultimate Frisbee tournament this weekend. Not a specific injury but dove and hit knees a few times and at one point when she went back in she had lateral left knee pain. Hurt to bend it for the first few days, starting to get better. Better in the morning but worse with walking and activity throughout the day. Sharper pain at first, limping the first day or two. Now more achy. Hurts more with stairs.     Reason for visit:     What part of your body is injured / painful?  left knee    What caused the injury /pain? Recreational/competitive sports injury - Other[place]    How long ago did your injury occur or pain begin? a week ago    What are your most bothersome symptoms? Pain and some Clicking but not sure if that's from this injury    How would you characterize your symptom?  aching    What makes your symptoms better? Ice    What makes your symptoms worse? Walking and Other: stairs    Have you been previously seen for this problem? No    Medical History:    Any recent changes to your medical history? No    Any new medication prescribed since last visit? No    Have you had surgery on this body part before? No    Social History:    Occupation: student    Handedness: Right    Exercise: Most days/week    Review of Systems:    Do you have fever, chills, weight loss? No    Do you have any vision problems? No    Do you have any chest pain or edema? No    Do you have any shortness of breath or wheezing?  No    Do you have stomach problems? No    Do you have any numbness or focal weakness? No    Do you have diabetes? No    Do you have problems with bleeding or clotting? No    Do you have an rashes or other skin lesions? No

## 2019-03-29 NOTE — PROGRESS NOTES
[facility]  Orthopedics  Joao Don MD  2019     Name: Kim Jenkins  MRN: [id number]  Age: 18 year old  : 2000  Referring provider: Referred Self     Chief Complaint: Pain of the Left Knee     Date of Injury: approximately one week ago     History of Present Illness:   Kim Jenkins is a 18 year old, female who presents today for evaluation of left knee pain. The patient reports experiencing lateral left knee pain after returning to an ultimate Frisbee game one week ago with no specific incident or trauma. Initially her pain was sharp in nature and now is more achy in nature. She has been having trouble with knee flexion since the incident. Her pain worsens throughout the day due to activities such as walking and going up stairs. Her pain has been gradually improving since the incident. She was limping for the first few days after the onset that has resolved. She has been experiencing occasional locking and snapping sensations. She has been using ice therapy that has given some relief. She voices no further concerns at this time.     Review of Systems:   A 10-point review of systems was obtained and is negative except for as noted in the HPI.     Medications:   Current Outpatient Medications:      AUBRA 0.1-20 MG-MCG per tablet, , Disp: , Rfl:      diclofenac (VOLTAREN) 75 MG EC tablet, Take 1 tablet (75 mg) by mouth 2 times daily, Disp: 20 tablet, Rfl: 0     ECONTRA EZ 1.5 MG TABS tablet, , Disp: , Rfl:      SF 5000 PLUS 1.1 % CREA, , Disp: , Rfl:      tiZANidine (ZANAFLEX) 4 MG tablet, Take 1 tablet (4 mg) by mouth At Bedtime, Disp: 10 tablet, Rfl: 0    Allergies:  Sulfa drugs     Past Medical History:  No past medical history on file.    Past Surgical History:  No past surgical history on file.     Social History:  Patient is single. She denies tobacco use and denies alcohol use.     Family History:  No family history on file.    Physical Examination:  Resp. rate  "16, height 1.626 m (5' 4\").  Patient is alert, No acute distress, pleasant and conversational.    Gait: nonantalgic. Normal heel toe gait.    Patient is able to perform two legged squat without difficulty.    left knee:   Skin intact. No erythema or ecchymosis.  No effusion or soft tissue swelling.    AROM: Zero to approximately 135  without restriction or reported pain.    Palpation: No medial or lateral facet joint tenderness.  No posterior medial or posterior lateral joint line tenderness   Tenderness over the lateral femoral condyle.     Special Tests:  Negative bounce test, negative forced flexion and negative Leon's.  No ligamentous laxity or pain with valgus or varus stress.  Negative Lachman's, Anterior Drawer and Posterior Drawer     Full Isometric quad strength, extensor mechanism in place     Neurovascularly intact in the lower extremity    Hip and Ankle with full AROM and nontender    Assessment:   18 year old, female with left IT band syndrome     Plan:     Patient will return to activities as tolerated with pain as her guide.    Prescribed at home exercises for IT band syndrome.  Will call if she would like referral to physical therapy.     Follow up with me as needed or if symptoms do not improve.     Joao Don MD    Scribe Disclosure:   Sher CROSS, am serving as a scribe to document services personally performed by Joao Don MD at this visit, based upon the provider's statements to me. All documentation has been reviewed by the aforementioned provider prior to being entered into the official medical record.    "

## 2019-03-29 NOTE — Clinical Note
3/29/2019       RE: Kim Jenkins  [address] [place] [address]     Dear Colleague,    Thank you for referring your patient, Kim Jenkins, to the [facility] SPORTS AND ORTHOPAEDIC WALK IN CLINIC at [facility]. Please see a copy of my visit note below.          SPORTS & ORTHOPEDIC WALK-IN VISIT 3/29/2019    Primary Care Physician:      Played ultimate Frisbee tournament this weekend. Not a specific injury but dove and hit knees a few times and at one point when she went back in she had lateral left knee pain. Hurt to bend it for the first few days, starting to get better. Better in the morning but worse with walking and activity throughout the day. Sharper pain at first, limping the first day or two. Now more achy. Hurts more with stairs.     Reason for visit:     What part of your body is injured / painful?  left knee    What caused the injury /pain? Recreational/competitive sports injury - Other:frisbee    How long ago did your injury occur or pain begin? a week ago    What are your most bothersome symptoms? Pain and some Clicking but not sure if that's from this injury    How would you characterize your symptom?  aching    What makes your symptoms better? Ice    What makes your symptoms worse? Walking and Other: stairs    Have you been previously seen for this problem? No    Medical History:    Any recent changes to your medical history? No    Any new medication prescribed since last visit? No    Have you had surgery on this body part before? No    Social History:    Occupation: student    Handedness: Right    Exercise: Most days/week    Review of Systems:    Do you have fever, chills, weight loss? No    Do you have any vision problems? No    Do you have any chest pain or edema? No    Do you have any shortness of breath or wheezing?  No    Do you have stomach problems? No    Do you have any numbness or focal weakness? No    Do you  have diabetes? No    Do you have problems with bleeding or clotting? No    Do you have an rashes or other skin lesions? No           [facility]  Orthopedics  Joao Don MD  2019     Name: Kim Jenkins  MRN: [id number]  Age: 18 year old  : 2000  Referring provider: Referred Self     Chief Complaint: Pain of the Left Knee     Date of Injury: approximately one week ago     History of Present Illness:   Kim Jenkins is a 18 year old, female who presents today for evaluation of left knee pain. The patient reports experiencing lateral left knee pain after returning to an ultimate Frisbee game one week ago with no specific incident or trauma. Initially her pain was sharp in nature and now is more achy in nature. She has been having trouble with knee flexion since the incident. Her pain worsens throughout the day due to activities such as walking and going up stairs. Her pain has been gradually improving since the incident. She was limping for the first few days after the onset that has resolved. She has been experiencing occasional locking and snapping sensations. She has been using ice therapy that has given some relief. She voices no further concerns at this time.     Review of Systems:   A 10-point review of systems was obtained and is negative except for as noted in the HPI.     Medications:   Current Outpatient Medications:      AUBRA 0.1-20 MG-MCG per tablet, , Disp: , Rfl:      diclofenac (VOLTAREN) 75 MG EC tablet, Take 1 tablet (75 mg) by mouth 2 times daily, Disp: 20 tablet, Rfl: 0     ECONTRA EZ 1.5 MG TABS tablet, , Disp: , Rfl:      SF 5000 PLUS 1.1 % CREA, , Disp: , Rfl:      tiZANidine (ZANAFLEX) 4 MG tablet, Take 1 tablet (4 mg) by mouth At Bedtime, Disp: 10 tablet, Rfl: 0    Allergies:  Sulfa drugs     Past Medical History:  No past medical history on file.    Past Surgical History:  No past surgical history on file.     Social History:  Patient is  "single. She denies tobacco use and denies alcohol use.     Family History:  No family history on file.    Physical Examination:  Resp. rate 16, height 1.626 m (5' 4\").  Patient is alert, No acute distress, pleasant and conversational.    Gait: nonantalgic. Normal heel toe gait.    Patient is able to perform two legged squat without difficulty.    left knee:   Skin intact. No erythema or ecchymosis.  No effusion or soft tissue swelling.    AROM: Zero to approximately 135  without restriction or reported pain.    Palpation: No medial or lateral facet joint tenderness.  No posterior medial or posterior lateral joint line tenderness   Tenderness over the lateral femoral condyle.     Special Tests:  Negative bounce test, negative forced flexion and negative Leon's.  No ligamentous laxity or pain with valgus or varus stress.  Negative Lachman's, Anterior Drawer and Posterior Drawer     Full Isometric quad strength, extensor mechanism in place     Neurovascularly intact in the lower extremity    Hip and Ankle with full AROM and nontender    Assessment:   18 year old, female with left IT band syndrome    Plan:     Patient will return to activities as tolerated with pain as her guide.    Prescribed at home exercises for IT band syndrome.      Follow up with me as needed or if symptoms do not improve.     Scribe Disclosure:   I, Sher Soto, am serving as a scribe to document services personally performed by Joao Don MD at this visit, based upon the provider's statements to me. All documentation has been reviewed by the aforementioned provider prior to being entered into the official medical record.      Again, thank you for allowing me to participate in the care of your patient.      Sincerely,    Joao Don MD    "

## 2019-04-15 ENCOUNTER — TELEPHONE (OUTPATIENT)
Dept: ORTHOPEDICS | Facility: CLINIC | Age: 19
End: 2019-04-15

## 2019-04-15 ENCOUNTER — THERAPY VISIT (OUTPATIENT)
Dept: PHYSICAL THERAPY | Facility: CLINIC | Age: 19
End: 2019-04-15
Payer: COMMERCIAL

## 2019-04-15 DIAGNOSIS — M25.552 HIP PAIN, LEFT: ICD-10-CM

## 2019-04-15 DIAGNOSIS — M25.562 LEFT KNEE PAIN, UNSPECIFIED CHRONICITY: Primary | ICD-10-CM

## 2019-04-15 PROCEDURE — 97112 NEUROMUSCULAR REEDUCATION: CPT | Mod: GP | Performed by: PHYSICAL THERAPIST

## 2019-04-15 PROCEDURE — 97161 PT EVAL LOW COMPLEX 20 MIN: CPT | Mod: GP | Performed by: PHYSICAL THERAPIST

## 2019-04-15 ASSESSMENT — ACTIVITIES OF DAILY LIVING (ADL)
LIGHT_TO_MODERATE_WORK: SLIGHT DIFFICULTY
SITTING_FOR_15_MINUTES: NO DIFFICULTY AT ALL
PUTTING_ON_SOCKS_AND_SHOES: NO DIFFICULTY AT ALL
HOW_WOULD_YOU_RATE_YOUR_CURRENT_LEVEL_OF_FUNCTION_DURING_YOUR_USUAL_ACTIVITIES_OF_DAILY_LIVING_FROM_0_TO_100_WITH_100_BEING_YOUR_LEVEL_OF_FUNCTION_PRIOR_TO_YOUR_HIP_PROBLEM_AND_0_BEING_THE_INABILITY_TO_PERFORM_ANY_OF_YOUR_USUAL_DAILY_ACTIVITIES?: 80
HOS_ADL_COUNT: 17
GOING_DOWN_1_FLIGHT_OF_STAIRS: SLIGHT DIFFICULTY
RECREATIONAL_ACTIVITIES: EXTREME DIFFICULTY
WALKING_DOWN_STEEP_HILLS: SLIGHT DIFFICULTY
WALKING_UP_STEEP_HILLS: SLIGHT DIFFICULTY
DEEP_SQUATTING: SLIGHT DIFFICULTY
HOS_ADL_SCORE(%): 69.12
GETTING_INTO_AND_OUT_OF_AN_AVERAGE_CAR: SLIGHT DIFFICULTY
HOS_ADL_HIGHEST_POTENTIAL_SCORE: 68
WALKING_APPROXIMATELY_10_MINUTES: SLIGHT DIFFICULTY
ROLLING_OVER_IN_BED: NO DIFFICULTY AT ALL
STEPPING_UP_AND_DOWN_CURBS: SLIGHT DIFFICULTY
TWISTING/PIVOTING_ON_INVOLVED_LEG: MODERATE DIFFICULTY
HOS_ADL_ITEM_SCORE_TOTAL: 47
HEAVY_WORK: MODERATE DIFFICULTY
GETTING_INTO_AND_OUT_OF_A_BATHTUB: NO DIFFICULTY AT ALL
GOING_UP_1_FLIGHT_OF_STAIRS: SLIGHT DIFFICULTY
WALKING_15_MINUTES_OR_GREATER: MODERATE DIFFICULTY
STANDING_FOR_15_MINUTES: MODERATE DIFFICULTY
WALKING_INITIALLY: SLIGHT DIFFICULTY

## 2019-04-15 NOTE — PROGRESS NOTES
[facility] Initial Evaluation  Subjective:  The history is provided by the patient.   Kim Jenkins is a 18 year old female with a left knee (left hip/thigh) condition.  Condition occurred with:  Insidious onset.  Condition occurred: during recreation/sport.  This is a new condition  March 24, 2019 - onset of lateral left knee pain after returning to an ultimate Frisbee game, no specific incident or trauma. Had a break between games, and just began having pain for the 2nd game. Took about 2-3 weeks of time off from playing and pain improved a bit, limited some lifting.   Pain initially was sharp but has improved to more of an ache, but can increase in sharpness again with incr activity. Pain with walking, stairs, bending the knee. Some snapping in the knee.   2 days ago, tried warming up and playing games and felt like pain was coming back, especially with the break between games again. She is now again planning to take time off from ultimate.  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    Patient reports pain:  Lateral.  Radiates to: lateral lower leg>latereral thigh. did notice some ankle/foot pain began yesterday.  Pain is described as sharp and is intermittent Pain Scale: up to 6-7/10 at worst.  Associated symptoms:  Other and loss of motion/stiffness (snapping. denies N/T). Pain is worse in the P.M..  Symptoms are exacerbated by ascending stairs, descending stairs, walking, activity and standing and relieved by rest.  Since onset symptoms are gradually improving.  Special testing: none.  Previous treatment: none.  Improvement with previous treatment: NA.  General health as reported by patient is good.                                              Objective:        Flexibility/Screens:   Negative screens: Lumbar                    Lumbar/SI Evaluation  ROM:    AROM Lumbar:   Flexion:           WNL - NE  Ext:                    WNL - NE   Side Bend:        Left:  WNL - NE    Right:  WNL - NE  Rotation:           Left:     Right:   Side Glide:        Left:     Right:                   Neural Tension/Mobility:      Left side:Slump  negative.     Right side:   Slump  negative.       Lumbar Provocation:      Left negative with:  Mobility and PROM hip    Right negative with:  Mobility and PROM hip    SI joint/Sacrum:          Left negative at:    Thigh thrust and Sacral thrust                                        Hip Evaluation  Hip PROM:    Flexion: Left: WNL painfree   Right: WNL        Internal Rotation: Left: 60    Right: 60  External Rotation: Left: 70    Right: 70              Hip Strength:    Flexion:   Left: 4+/5   Pain:  Right: 5-/5   Pain:                    Extension:  Left: 4+/5  Pain:Right: 5-/5    Pain:    Abduction:  Left: 4/5     Pain:Right: 4/5    Pain:        Knee Flexion:  Left: 5-/5   Pain:Right: 5-/5   Pain:  Knee Extension:  Left: 5-/5   Pain:Right: 5-/5    Pain:        Hip Special Testing:    Left hip positive for the following special tests:  Fadir/Labrum      Hip Palpation:  Palpations normal left hip: incr tone/tenderess left TFL/rectus.  Left hip tenderness present at:   hip flexors  Left hip tenderness not present at:  IT Band; ASIS; Iliac Crest or Gluteus Medius    Functional Testing:  Functional test hip: 2-3/10 pain at anterior/lateral hip with front step up. difficulty controlling level pelvis with SL stance on L.        Quad:      Bilateral leg squat: Pain at anterior/lat left hip with coming upright                     [place] Lumbar Evaluation        Test Movements:  FIS: During: no effect  After: no effect  Pretest Movements: pain 2-3/10 in lateral thigh with front step up  Repeat FIS: During: increases  After: worse    EIS: During: no effect  After: no effect    Repeat EIS: During: no effect  After: no effect      EIL: During: no effect  After: no effect    Repeat  EIL: During: no effect  After: no effect                                                   ROS  Slight decr pain with front step up after left hip flexor release, further decrease after left hip ABD activation    Assessment/Plan:    Patient is a 18 year old female with left side hip complaints.    Patient has the following significant findings with corresponding treatment plan.                Diagnosis 1:  Left hip pain    Pain -  manual therapy, self management, education and home program  Decreased ROM/flexibility - manual therapy, therapeutic exercise and home program  Decreased strength - therapeutic exercise, therapeutic activities and home program  Decreased proprioception - neuro re-education, therapeutic activities and home program  Impaired muscle performance - neuro re-education and home program  Decreased function - therapeutic activities and home program    Therapy Evaluation Codes:   1) History comprised of:   Personal factors that impact the plan of care:      None.    Comorbidity factors that impact the plan of care are:      None.     Medications impacting care: None.  2) Examination of Body Systems comprised of:   Body structures and functions that impact the plan of care:      Hip and Knee.   Activity limitations that impact the plan of care are:      Sports, Squatting/kneeling, Stairs and Walking.  3) Clinical presentation characteristics are:   Stable/Uncomplicated.  4) Decision-Making    Moderate complexity using standardized patient assessment instrument and/or measureable assessment of functional outcome.  Cumulative Therapy Evaluation is: Low complexity.    Previous and current functional limitations:  (See Goal Flow Sheet for this information)    Short term and Long term goals: (See Goal Flow Sheet for this information)     Communication ability:  Patient appears to be able to clearly communicate and understand verbal and written communication and follow directions correctly.  Treatment  Explanation - The following has been discussed with the patient:   RX ordered/plan of care  Anticipated outcomes  Possible risks and side effects  This patient would benefit from PT intervention to resume normal activities.   Rehab potential is good.    Frequency:  1 X week, once daily  Duration:  for 8 weeks  Discharge Plan:  Achieve all LTG.  Independent in home treatment program.  Reach maximal therapeutic benefit.    Please refer to the daily flowsheet for treatment today, total treatment time and time spent performing 1:1 timed codes.

## 2019-04-26 ENCOUNTER — THERAPY VISIT (OUTPATIENT)
Dept: PHYSICAL THERAPY | Facility: CLINIC | Age: 19
End: 2019-04-26
Payer: COMMERCIAL

## 2019-04-26 DIAGNOSIS — M25.562 LEFT KNEE PAIN, UNSPECIFIED CHRONICITY: ICD-10-CM

## 2019-04-26 DIAGNOSIS — M25.552 HIP PAIN, LEFT: ICD-10-CM

## 2019-04-26 PROCEDURE — 97530 THERAPEUTIC ACTIVITIES: CPT | Mod: GP | Performed by: PHYSICAL THERAPIST

## 2019-04-26 PROCEDURE — 97110 THERAPEUTIC EXERCISES: CPT | Mod: GP | Performed by: PHYSICAL THERAPIST

## 2019-04-26 PROCEDURE — 97112 NEUROMUSCULAR REEDUCATION: CPT | Mod: GP | Performed by: PHYSICAL THERAPIST

## 2019-05-03 ENCOUNTER — THERAPY VISIT (OUTPATIENT)
Dept: PHYSICAL THERAPY | Facility: CLINIC | Age: 19
End: 2019-05-03
Payer: COMMERCIAL

## 2019-05-03 DIAGNOSIS — M25.552 HIP PAIN, LEFT: ICD-10-CM

## 2019-05-03 PROCEDURE — 97112 NEUROMUSCULAR REEDUCATION: CPT | Mod: GP | Performed by: PHYSICAL THERAPIST

## 2019-05-03 PROCEDURE — 97110 THERAPEUTIC EXERCISES: CPT | Mod: GP | Performed by: PHYSICAL THERAPIST

## 2019-05-03 PROCEDURE — 97530 THERAPEUTIC ACTIVITIES: CPT | Mod: GP | Performed by: PHYSICAL THERAPIST

## 2019-05-13 ENCOUNTER — THERAPY VISIT (OUTPATIENT)
Dept: PHYSICAL THERAPY | Facility: CLINIC | Age: 19
End: 2019-05-13
Payer: COMMERCIAL

## 2019-05-13 DIAGNOSIS — M25.552 HIP PAIN, LEFT: ICD-10-CM

## 2019-05-13 PROCEDURE — 97530 THERAPEUTIC ACTIVITIES: CPT | Mod: GP | Performed by: PHYSICAL THERAPIST

## 2019-05-13 PROCEDURE — 97110 THERAPEUTIC EXERCISES: CPT | Mod: GP | Performed by: PHYSICAL THERAPIST

## 2019-05-13 PROCEDURE — 97140 MANUAL THERAPY 1/> REGIONS: CPT | Mod: GP | Performed by: PHYSICAL THERAPIST

## 2019-05-20 ENCOUNTER — THERAPY VISIT (OUTPATIENT)
Dept: PHYSICAL THERAPY | Facility: CLINIC | Age: 19
End: 2019-05-20
Payer: COMMERCIAL

## 2019-05-20 DIAGNOSIS — M25.552 HIP PAIN, LEFT: ICD-10-CM

## 2019-05-20 PROCEDURE — 97140 MANUAL THERAPY 1/> REGIONS: CPT | Mod: GP | Performed by: PHYSICAL THERAPIST

## 2019-05-20 PROCEDURE — 97110 THERAPEUTIC EXERCISES: CPT | Mod: GP | Performed by: PHYSICAL THERAPIST

## 2019-05-20 PROCEDURE — 97530 THERAPEUTIC ACTIVITIES: CPT | Mod: GP | Performed by: PHYSICAL THERAPIST

## 2019-05-20 ASSESSMENT — ACTIVITIES OF DAILY LIVING (ADL)
STEPPING_UP_AND_DOWN_CURBS: NO DIFFICULTY AT ALL
SITTING_FOR_15_MINUTES: NO DIFFICULTY AT ALL
HOS_ADL_ITEM_SCORE_TOTAL: 66
WALKING_15_MINUTES_OR_GREATER: NO DIFFICULTY AT ALL
WALKING_UP_STEEP_HILLS: NO DIFFICULTY AT ALL
GOING_DOWN_1_FLIGHT_OF_STAIRS: NO DIFFICULTY AT ALL
WALKING_INITIALLY: NO DIFFICULTY AT ALL
HOW_WOULD_YOU_RATE_YOUR_CURRENT_LEVEL_OF_FUNCTION_DURING_YOUR_USUAL_ACTIVITIES_OF_DAILY_LIVING_FROM_0_TO_100_WITH_100_BEING_YOUR_LEVEL_OF_FUNCTION_PRIOR_TO_YOUR_HIP_PROBLEM_AND_0_BEING_THE_INABILITY_TO_PERFORM_ANY_OF_YOUR_USUAL_DAILY_ACTIVITIES?: 99
HEAVY_WORK: NO DIFFICULTY AT ALL
HOS_ADL_HIGHEST_POTENTIAL_SCORE: 68
STANDING_FOR_15_MINUTES: NO DIFFICULTY AT ALL
PUTTING_ON_SOCKS_AND_SHOES: NO DIFFICULTY AT ALL
GOING_UP_1_FLIGHT_OF_STAIRS: NO DIFFICULTY AT ALL
WALKING_APPROXIMATELY_10_MINUTES: NO DIFFICULTY AT ALL
HOS_ADL_COUNT: 17
WALKING_DOWN_STEEP_HILLS: NO DIFFICULTY AT ALL
RECREATIONAL_ACTIVITIES: SLIGHT DIFFICULTY
GETTING_INTO_AND_OUT_OF_AN_AVERAGE_CAR: NO DIFFICULTY AT ALL
DEEP_SQUATTING: NO DIFFICULTY AT ALL
HOS_ADL_SCORE(%): 97.06
LIGHT_TO_MODERATE_WORK: NO DIFFICULTY AT ALL
GETTING_INTO_AND_OUT_OF_A_BATHTUB: NO DIFFICULTY AT ALL
ROLLING_OVER_IN_BED: NO DIFFICULTY AT ALL
TWISTING/PIVOTING_ON_INVOLVED_LEG: SLIGHT DIFFICULTY

## 2019-06-10 ENCOUNTER — THERAPY VISIT (OUTPATIENT)
Dept: PHYSICAL THERAPY | Facility: CLINIC | Age: 19
End: 2019-06-10
Payer: COMMERCIAL

## 2019-06-10 DIAGNOSIS — M25.552 HIP PAIN, LEFT: ICD-10-CM

## 2019-06-10 PROCEDURE — 97530 THERAPEUTIC ACTIVITIES: CPT | Mod: GP | Performed by: PHYSICAL THERAPIST

## 2019-06-10 PROCEDURE — 97140 MANUAL THERAPY 1/> REGIONS: CPT | Mod: GP | Performed by: PHYSICAL THERAPIST

## 2019-06-10 PROCEDURE — 97112 NEUROMUSCULAR REEDUCATION: CPT | Mod: GP | Performed by: PHYSICAL THERAPIST

## 2019-06-10 NOTE — PROGRESS NOTES
PROGRESS  REPORT    Progress reporting period is from 4/15/19 to 6/10/19.       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 first tourney in july, but does have a warmup tourney next week. in nationals, was playing a normal amount. was fine during practicing leading up. during the second game maybe tweaked it a bit. rolling helped. was a little sore after 3rd game. barely had any time off prior to tryouts. first day of tryouts had to lay low a bit. did fall off from her PT a bit while traveling and as she was doing good prior to nationals. is back at it more now. has been feeling better since getting back to PT. pain does go down thigh and slightly into knee. max pain is 4/10     Current Pain level: 0/10.      Initial Pain level: 7/10.   Changes in function:  Yes (See Goal flowsheet attached for changes in current functional level)  Adverse reaction to treatment or activity: None    OBJECTIVE  Changes noted in objective findings:  Yes  Objective: painfree lumbar flexion and extension WNL, paifnree DL squat, SL squat: R good control, L min loss of control painfree. continued tone/tenderness glute med/min. continued light decr control of left hip versus right, but improved.     ASSESSMENT/PLAN  Updated problem list and treatment plan: Diagnosis 1:  Left hip pain vs left lumbar radiculopathy    Pain -  manual therapy, self management, education, directional preference exercise and home program  Decreased ROM/flexibility - manual therapy, therapeutic exercise and home program  Decreased strength - therapeutic exercise, therapeutic activities and home program  Impaired balance - neuro re-education, therapeutic activities and home program  Decreased proprioception - neuro re-education, therapeutic activities and home program  Decreased function - therapeutic activities and home program  STG/LTGs have been met or progress has been made towards goals:  Yes (See Goal flow sheet  completed today.)  Assessment of Progress: The patient's condition is improving.  Patient is meeting short term goals and is progressing towards long term goals.  Self Management Plans:  Patient has been instructed in a home treatment program.  Patient  has been instructed in self management of symptoms.  I have re-evaluated this patient and find that the nature, scope, duration and intensity of the therapy is appropriate for the medical condition of the patient.  Kim continues to require the following intervention to meet STG and LTG's:  PT    Recommendations:  This patient would benefit from continued therapy.     Frequency:  2 X a month, once daily  Duration:  for 2 months        Please refer to the daily flowsheet for treatment today, total treatment time and time spent performing 1:1 timed codes.

## 2019-06-24 ENCOUNTER — THERAPY VISIT (OUTPATIENT)
Dept: PHYSICAL THERAPY | Facility: CLINIC | Age: 19
End: 2019-06-24
Payer: COMMERCIAL

## 2019-06-24 DIAGNOSIS — M25.552 HIP PAIN, LEFT: ICD-10-CM

## 2019-06-24 PROCEDURE — 97530 THERAPEUTIC ACTIVITIES: CPT | Mod: GP | Performed by: PHYSICAL THERAPIST

## 2019-06-24 PROCEDURE — 97140 MANUAL THERAPY 1/> REGIONS: CPT | Mod: GP | Performed by: PHYSICAL THERAPIST

## 2019-06-24 PROCEDURE — 97110 THERAPEUTIC EXERCISES: CPT | Mod: GP | Performed by: PHYSICAL THERAPIST

## 2019-07-16 ENCOUNTER — THERAPY VISIT (OUTPATIENT)
Dept: PHYSICAL THERAPY | Facility: CLINIC | Age: 19
End: 2019-07-16
Payer: COMMERCIAL

## 2019-07-16 DIAGNOSIS — M25.552 HIP PAIN, LEFT: ICD-10-CM

## 2019-07-16 PROCEDURE — 97110 THERAPEUTIC EXERCISES: CPT | Mod: GP | Performed by: PHYSICAL THERAPIST

## 2019-07-16 PROCEDURE — 97140 MANUAL THERAPY 1/> REGIONS: CPT | Mod: GP | Performed by: PHYSICAL THERAPIST

## 2019-07-16 NOTE — PROGRESS NOTES
DISCHARGE REPORT    Progress reporting period is from 6/11/19 to 7/16/19.       SUBJECTIVE  Subjective: pt had ultimate trip in [place], on the 2nd day hit her head and had to rest a bit but is back to playing. otherwise her knee did not bother her at all. maybe had a tiny bit of soreness once. did a lot of sprinting prior to the trip and hill running, and all that went fine. even doing activity multiple days in a row.  feels good with continuing on her own now    Current Pain level: 0/10.      Initial Pain level: 7/10.   Changes in function:  Yes (See Goal flowsheet attached for changes in current functional level)  Adverse reaction to treatment or activity: None    OBJECTIVE  Changes noted in objective findings:  Yes  Objective: good SL squat form/control bilaterally, painfree. very mild tenderness and mild restriction remains at mid-distal ITB. pt demos good understanding of maintenance plan     ASSESSMENT/PLAN  Updated problem list and treatment plan: Diagnosis 1:  Left hip pain vs left lumbar radiculopathy    Decreased ROM/flexibility - home program  STG/LTGs have been met or progress has been made towards goals:  Yes (See Goal flow sheet completed today.)  Assessment of Progress: The patient has met all of their long term goals.  Self Management Plans:  Patient has been instructed in a home treatment program.  Patient is independent in a home treatment program.  Patient  has been instructed in self management of symptoms.  Patient is independent in self management of symptoms.  I have re-evaluated this patient and find that the nature, scope, duration and intensity of the therapy is appropriate for the medical condition of the patient.  Kim continues to require the following intervention to meet STG and LTG's:  PT intervention is no longer required to meet STG/LTG.    Recommendations:  This patient is ready to be discharged from therapy and continue their home treatment program.    Please refer to the  daily flowsheet for treatment today, total treatment time and time spent performing 1:1 timed codes.

## 2019-10-28 ENCOUNTER — OFFICE VISIT (OUTPATIENT)
Dept: ORTHOPEDICS | Facility: CLINIC | Age: 19
End: 2019-10-28
Payer: COMMERCIAL

## 2019-10-28 ENCOUNTER — ANCILLARY PROCEDURE (OUTPATIENT)
Dept: GENERAL RADIOLOGY | Facility: CLINIC | Age: 19
End: 2019-10-28
Attending: FAMILY MEDICINE
Payer: COMMERCIAL

## 2019-10-28 VITALS — BODY MASS INDEX: 21.46 KG/M2 | HEIGHT: 64 IN

## 2019-10-28 DIAGNOSIS — M25.512 ACUTE PAIN OF LEFT SHOULDER: Primary | ICD-10-CM

## 2019-10-28 DIAGNOSIS — M25.512 ACUTE PAIN OF LEFT SHOULDER: ICD-10-CM

## 2019-10-28 NOTE — Clinical Note
10/28/2019       RE: Kim Jenkins  [address] [place] [address]     Dear Colleague,    Thank you for referring your patient, Kim Jenkins, to the [facility] SPORTS AND ORTHOPAEDIC WALK IN CLINIC at [facility]. Please see a copy of my visit note below.          SPORTS & ORTHOPEDIC WALK-IN VISIT 10/28/2019    Primary Care Physician:      About a month ago was playing frisbee, extended left arm to catch frisbee and believes her shoulder subluxed. Felt okay so continued playing and felt it sublux again. Stopped playing. Was sore for a few days. But things were okay for a while but last week raised arm and though she felt a sublux again. Has also happened at least one more time, but felt more crunching in the shoulder. Played another tournament this weekend and didn't hurt it but still felt sore. Pain mostly in anterior shoulder.   States she had a right shoulder subluxation before but that never happened again, this feels more severe.   Left knee is doing better.     Reason for visit:     What part of your body is injured / painful?  left shoulder    What caused the injury /pain? Ultimate frisbee     How long ago did your injury occur or pain begin? about a month ago    What are your most bothersome symptoms? Pain, Clicking, popping and Giving way or instability    How would you characterize your symptom?  aching and sharp    What makes your symptoms better? Ice, Ibuprofen and Other: tiger balm     What makes your symptoms worse? Overhead motion and Other: reaching     Have you been previously seen for this problem? No    Medical History:    Any recent changes to your medical history? No    Any new medication prescribed since last visit? No    Have you had surgery on this body part before? No    Social History:    Occupation: student     Handedness: Right    Exercise: Most days/week    Review of Systems:    Do you have fever, chills,  weight loss? No    Do you have any vision problems? No    Do you have any chest pain or edema? No    Do you have any shortness of breath or wheezing?  No    Do you have stomach problems? No    Do you have any numbness or focal weakness? No    Do you have diabetes? No    Do you have problems with bleeding or clotting? No    Do you have an rashes or other skin lesions? No           [facility]  Orthopedics  Joao Don MD  10/28/2019     Name: Kim Jenkins  MRN: [id number]  Age: 19 year old  : 2000  Referring provider: Referred Self     Chief Complaint: Pain of the Left Shoulder    Date of Injury: 19    History of Present Illness:   Kim Jenkins is a 19 year old, right handed female who presents today for evaluation of left shoulder pain. Patient reports approximately 1 month ago she was playing Frisbee and caught the Frisbee with her left hand and went down on the left arm experienced an instability event similar to subluxation that was resolved . History of right shoulder subluxation. She has pain with lifting/reaching motions and laying on the left side. She has tried treating the pain with ice, ibuprofen, and tiger balm.    Review of Systems:   A 10-point review of systems was obtained and is negative except for as noted in the HPI.     Medications:     Current Outpatient Medications:      AUBRA 0.1-20 MG-MCG per tablet, , Disp: , Rfl:      CHATEAL EQ 0.15-30 MG-MCG tablet, , Disp: , Rfl:      diclofenac (VOLTAREN) 75 MG EC tablet, Take 1 tablet (75 mg) by mouth 2 times daily (Patient not taking: Reported on 3/29/2019), Disp: 20 tablet, Rfl: 0     ECONTRA EZ 1.5 MG TABS tablet, , Disp: , Rfl:      SF 5000 PLUS 1.1 % CREA, , Disp: , Rfl:      tiZANidine (ZANAFLEX) 4 MG tablet, Take 1 tablet (4 mg) by mouth At Bedtime (Patient not taking: Reported on 3/29/2019), Disp: 10 tablet, Rfl: 0    Allergies:  Sulfa drugs     Past Medical History:  No past medical history on  "file.    Past Surgical History:  No past surgical history on file.     Social History:  Presents to clinic alone  Tobacco Use: none  Alcohol Use: none  PCP: Physician No Ref-Primary    Family History:  No family history on file.    Physical Examination:  Height 1.626 m (5' 4\").  General: Normal appearance, in no obvious distress.  Pulmonary: Normal respiratory pattern, non-labored.  Skin: No ecchymosis, erythema, warmth, induration, or obvious rash.  Psych: Interactive, appropriate, normal mood and affect.   Neuro: No motor deficit, grossly normal coordination, normal muscle tone.   Left shoulder: ***  Positive apprehension and relocation  Positive biceps load    Imaging:   Radiographs of the left shoulder - 2 views (10/28/2019)  IMPRESSION: No evidence of fracture or dislocation involving the left shoulder.  ARACELI GILL MD    I have independently reviewed the above imaging studies; the results were discussed with the patient.     Assessment:   19 year old, right handed female with left shoulder subluxation    Plan:     Formal course of physical therapy along with home exercises    Can play as tolerated with pain    Follow up in 6 weeks if not returning to normal activity      Scribe Disclosure:  I, Ave Marti, am serving as a scribe to document services personally performed by Joao Don MD at this visit, based upon the provider's statements to me. All documentation has been reviewed by the aforementioned provider prior to being entered into the official medical record.    Again, thank you for allowing me to participate in the care of your patient.      Sincerely,    Joao Don MD    "

## 2019-10-28 NOTE — PROGRESS NOTES
SPORTS & ORTHOPEDIC WALK-IN VISIT 10/28/2019    Primary Care Physician:      About a month ago was playing [employer], extended left arm to catch [employer] and believes her shoulder subluxed. Felt okay so continued playing and felt it sublux again. Stopped playing. Was sore for a few days. But things were okay for a while but last week raised arm and though she felt a sublux again. Has also happened at least one more time, but felt more crunching in the shoulder. Played another tournament this weekend and didn't hurt it but still felt sore. Pain mostly in anterior shoulder.   States she had a right shoulder subluxation before but that never happened again, this feels more severe.   Left knee is doing better.     Reason for visit:     What part of your body is injured / painful?  left shoulder    What caused the injury /pain? Ultimate [employer]     How long ago did your injury occur or pain begin? about a month ago    What are your most bothersome symptoms? Pain, Clicking, popping and Giving way or instability    How would you characterize your symptom?  aching and sharp    What makes your symptoms better? Ice, Ibuprofen and Other: tiger balm     What makes your symptoms worse? Overhead motion and Other: reaching     Have you been previously seen for this problem? No    Medical History:    Any recent changes to your medical history? No    Any new medication prescribed since last visit? No    Have you had surgery on this body part before? No    Social History:    Occupation: student     Handedness: Right    Exercise: Most days/week    Review of Systems:    Do you have fever, chills, weight loss? No    Do you have any vision problems? No    Do you have any chest pain or edema? No    Do you have any shortness of breath or wheezing?  No    Do you have stomach problems? No    Do you have any numbness or focal weakness? No    Do you have diabetes? No    Do you have problems with bleeding or clotting? No    Do you have an  rashes or other skin lesions? No

## 2019-10-28 NOTE — PROGRESS NOTES
"[facility]  Orthopedics  Joao Don MD  2019     Name: Kim Jenkins  MRN: [id number]  Age: 19 year old  : 2000  Referring provider: Referred Self     Chief Complaint: Pain of the Left Shoulder    Date of Injury: 19    History of Present Illness:   Kim Jenkins is a 19 year old, right handed female who presents today for evaluation of left shoulder pain. Patient reports approximately 1 month ago she was playing Frisbee and caught the Frisbee with her left hand and went down on the left arm experienced an instability event similar to subluxation that was resolved . History of right shoulder subluxation but no dislocation. She has pain with lifting/reaching motions and laying on the left side. She has tried treating the pain with ice, ibuprofen, and tiger balm.    Review of Systems:   A 10-point review of systems was obtained and is negative except for as noted in the HPI.     Medications:     Current Outpatient Medications:      AUBRA 0.1-20 MG-MCG per tablet, , Disp: , Rfl:      CHATEAL EQ 0.15-30 MG-MCG tablet, , Disp: , Rfl:      diclofenac (VOLTAREN) 75 MG EC tablet, Take 1 tablet (75 mg) by mouth 2 times daily (Patient not taking: Reported on 3/29/2019), Disp: 20 tablet, Rfl: 0     ECONTRA EZ 1.5 MG TABS tablet, , Disp: , Rfl:      SF 5000 PLUS 1.1 % CREA, , Disp: , Rfl:      tiZANidine (ZANAFLEX) 4 MG tablet, Take 1 tablet (4 mg) by mouth At Bedtime (Patient not taking: Reported on 3/29/2019), Disp: 10 tablet, Rfl: 0    Allergies:  Sulfa drugs     Past Medical History:  No past medical history on file.    Past Surgical History:  No past surgical history on file.     Social History:  Presents to clinic alone  Tobacco Use: none  Alcohol Use: none  PCP: Physician No Ref-Primary    Family History:  No family history on file.    Physical Examination:  Height 1.626 m (5' 4\").  EXAM:  Alert, pleasant and conversational    Neck with full AROM, non-tender to " midline cervical and upper thoracic spine palpation, negative Spurling's.  Elbow with FROM and non-tender to palpation      left shoulder:   Skin intact. No skin changes, deformity or atrophy    AROM: Symmetric without pain.    Strength testing: Abduction: 5/5, Abduction with 30  horizontal flexion and internal rotation: 5/5, External rotation: 5/5, Internal rotation 5/5.  Deltoids 5/5, Biceps 5/5, Triceps 5/5,  5/5.    Palpation: negative TTP of the Acromioclavicular joint  negative TTP of Sternoclavicular joint  negative TTP of posterior glenoid  negative TTP of scapular borders  negative TTP of the bicipital tendon.     Special Tests: negative De Dios test  negative Neer's test.   positive Rockton's test   negative Speed's test.  Positive apprehension and relocation  Positive biceps loadNeurovascularly intact bilateral upper extremities.    Positive apprehension and relocation  Positive biceps load    Imaging:   Radiographs of the left shoulder - 4 views (11/03/2019)  IMPRESSION: No evidence of fracture or dislocation involving the left shoulder.    I have independently reviewed the above imaging studies; the results were discussed with the patient.     Assessment:   19 year old, right handed female with left shoulder subluxation event    Plan:     Reviewed imaging assessment the patient in detail    Formal course of physical therapy along with home exercises    Okay to continue frisbee as tolerated, as this is a fairly low risk activity.    Follow up in 6 weeks if not returning to normal activity    Joao Don MD    Scribe Disclosure:  Ave CROSS, am serving as a scribe to document services personally performed by Joao Don MD at this visit, based upon the provider's statements to me. All documentation has been reviewed by the aforementioned provider prior to being entered into the official medical record.

## 2019-11-06 ENCOUNTER — THERAPY VISIT (OUTPATIENT)
Dept: PHYSICAL THERAPY | Facility: CLINIC | Age: 19
End: 2019-11-06
Payer: COMMERCIAL

## 2019-11-06 DIAGNOSIS — M25.512 ACUTE PAIN OF LEFT SHOULDER: ICD-10-CM

## 2019-11-06 PROCEDURE — 97112 NEUROMUSCULAR REEDUCATION: CPT | Mod: GP | Performed by: PHYSICAL THERAPIST

## 2019-11-06 PROCEDURE — 97161 PT EVAL LOW COMPLEX 20 MIN: CPT | Mod: GP | Performed by: PHYSICAL THERAPIST

## 2019-11-06 NOTE — PROGRESS NOTES
[facility] Initial Evaluation  Subjective:  The history is provided by the patient.   Kim Jenkins being seen for left shoulder pain.   Date of Onset: September 2019. Where condition occurred: during recreation / sport (playing frisbee).Problem occurred: fall while playing frisbee  and reported as 3/10 (5/10 at worst) on pain scale. General health as reported by patient is good.            Pain is described as aching and sharp and is intermittent. Pain timing: fluctuates depending on activity. Since onset symptoms are gradually improving. Special tests:  X-ray. Past treatment: none. Improved with treatment: N/A.          Type of problem:  Left shoulder   Condition occurred with:  A fall. This is a new condition   Problem details: MD order 10/28/19.  Injury September 2019. Patient dove to catch a frisbee and landed on an outstretched left arm. She said that her shoulder felt unstable and like it subluxed or dislocated. Immediately after, patient noticed difficulty with moving her shoulder. She had increased pain in the days following, but pain gradually getting better. Patient has had a few instances of feeling instability with shoulder movements over the last month or so since her injury.   She has played [employer] tournaments the past 3 weekends and it was okay for the most part. But has noticed a few times where it felt unstable. She is done now with [employer] until March.   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  Patient is right handed. She also has a history of instability event on R shoulder..   Patient reports pain:  Anterior, lateral and upper arm (over deltoid). Radiates to:  Other (occasional to shoulder blade when it flares up; sometimes notices it radiating down her arm with reaching motions). Associated symptoms:  Other, dislocating/subluxing and loss of strength (clicking/cracking, denies N/T). Symptoms are exacerbated by  lifting, using arm at shoulder level, using arm behind back, lying on extremity, using arm overhead and carrying and relieved by NSAID's.                      Objective:        Flexibility/Screens:   Positive screens:  Shoulder                             Shoulder Evaluation:  ROM:  AROM:  : Active ER at side: R 66 deg, L 55 deg; Passive ER in supine at 45 deg: R 90 deg, L 63 deg.  Flexion:  Left:  WNL, pain at end range    Right:  WNL    Abduction:  Left: WNL, pain at end range   Right:  WNL    Internal Rotation:  Left:  WNL    Right:  WNL                  PROM:    Flexion:  Left:  WNL          Abduction:  Left:  Patient apprehensive with movement, pain above 90 deg        Internal Rotation:  Left:  WNL    Right:  WNL                      Strength:  : ER at 90: 4-/5 with pain; IR at 90: 4/5, no pain.  Flexion: Left:4+/5    Pain: -    Right: 5-/5     Pain:     Abduction:  Left: 4/5   Pain:+    Right: 5-/5     Pain:    Internal Rotation:  Left:5-/5      Pain:-    Right: 5/5     Pain:  External Rotation:   Left:4/5      Pain:+   Right:4+/5     Pain:        Elbow Flexion:  Left:4+/5     Pain:    Right:5/5     Pain:  Elbow Extension:  Left:5-/5     Pain:    Right:4+/5      Pain:+  Stability Testing:    Left shoulder stability positive testing:  Apprehension and Relocation        Mobility Tests:  Mobility wnl shoulder: excessive upward rotation on L scapula with active abduction ROM; normal scapular protraction with closed chain wall push up.  Glenohumeral anterior left:  Hypermobile  Glenohumeral anterior right:  Normal    Glenohumeral inferior left:  Hypermobile  Glenohumeral inferior right:  Normal                                             General     ROS    Assessment/Plan:    Patient is a 19 year old female with left side shoulder complaints.    Patient has the following significant findings with corresponding treatment plan.                Diagnosis 1:  L shoulder pain following instability event    Pain -   hot/cold therapy, self management, education and home program  Decreased ROM/flexibility - manual therapy, therapeutic exercise and home program  Decreased strength - therapeutic exercise, therapeutic activities and home program  Impaired muscle performance - neuro re-education and home program  Decreased function - therapeutic activities and home program  Instability -  Therapeutic Activity  Therapeutic Exercise  Neuromuscular Re-education  home program    Therapy Evaluation Codes:   1) History comprised of:   Personal factors that impact the plan of care:      None.    Comorbidity factors that impact the plan of care are:      None.     Medications impacting care: None.  2) Examination of Body Systems comprised of:   Body structures and functions that impact the plan of care:      Shoulder.   Activity limitations that impact the plan of care are:      Lifting, Sports and reaching.  3) Clinical presentation characteristics are:   Stable/Uncomplicated.  4) Decision-Making    Low complexity using standardized patient assessment instrument and/or measureable assessment of functional outcome.  Cumulative Therapy Evaluation is: Low complexity.    Previous and current functional limitations:  (See Goal Flow Sheet for this information)    Short term and Long term goals: (See Goal Flow Sheet for this information)     Communication ability:  Patient appears to be able to clearly communicate and understand verbal and written communication and follow directions correctly.  Treatment Explanation - The following has been discussed with the patient:   RX ordered/plan of care  Anticipated outcomes  Possible risks and side effects  This patient would benefit from PT intervention to resume normal activities.   Rehab potential is good.    Frequency:  1 X week, once daily  Duration:  for 4 weeks tapering to 2 X a month over 12 weeks  Discharge Plan:  Achieve all LTG.  Independent in home treatment program.  Reach maximal therapeutic  benefit.    Please refer to the daily flowsheet for treatment today, total treatment time and time spent performing 1:1 timed codes.

## 2019-11-13 ENCOUNTER — THERAPY VISIT (OUTPATIENT)
Dept: PHYSICAL THERAPY | Facility: CLINIC | Age: 19
End: 2019-11-13
Payer: COMMERCIAL

## 2019-11-13 DIAGNOSIS — M25.512 ACUTE PAIN OF LEFT SHOULDER: ICD-10-CM

## 2019-11-13 PROCEDURE — 97110 THERAPEUTIC EXERCISES: CPT | Mod: GP | Performed by: PHYSICAL THERAPIST

## 2019-11-13 PROCEDURE — 97112 NEUROMUSCULAR REEDUCATION: CPT | Mod: GP | Performed by: PHYSICAL THERAPIST

## 2019-11-19 ENCOUNTER — THERAPY VISIT (OUTPATIENT)
Dept: PHYSICAL THERAPY | Facility: CLINIC | Age: 19
End: 2019-11-19
Payer: COMMERCIAL

## 2019-11-19 DIAGNOSIS — M25.512 ACUTE PAIN OF LEFT SHOULDER: ICD-10-CM

## 2019-11-19 PROCEDURE — 97112 NEUROMUSCULAR REEDUCATION: CPT | Mod: GP | Performed by: PHYSICAL THERAPIST

## 2019-11-19 PROCEDURE — 97110 THERAPEUTIC EXERCISES: CPT | Mod: GP | Performed by: PHYSICAL THERAPIST

## 2019-11-26 ENCOUNTER — THERAPY VISIT (OUTPATIENT)
Dept: PHYSICAL THERAPY | Facility: CLINIC | Age: 19
End: 2019-11-26
Payer: COMMERCIAL

## 2019-11-26 DIAGNOSIS — M25.512 ACUTE PAIN OF LEFT SHOULDER: ICD-10-CM

## 2019-11-26 PROCEDURE — 97140 MANUAL THERAPY 1/> REGIONS: CPT | Mod: GP | Performed by: PHYSICAL THERAPIST

## 2019-11-26 PROCEDURE — 97110 THERAPEUTIC EXERCISES: CPT | Mod: GP | Performed by: PHYSICAL THERAPIST

## 2019-11-26 PROCEDURE — 97112 NEUROMUSCULAR REEDUCATION: CPT | Mod: GP | Performed by: PHYSICAL THERAPIST

## 2019-12-12 ENCOUNTER — THERAPY VISIT (OUTPATIENT)
Dept: PHYSICAL THERAPY | Facility: CLINIC | Age: 19
End: 2019-12-12
Payer: COMMERCIAL

## 2019-12-12 DIAGNOSIS — M25.512 ACUTE PAIN OF LEFT SHOULDER: ICD-10-CM

## 2019-12-12 PROCEDURE — 97112 NEUROMUSCULAR REEDUCATION: CPT | Mod: GP | Performed by: PHYSICAL THERAPIST

## 2019-12-12 PROCEDURE — 97530 THERAPEUTIC ACTIVITIES: CPT | Mod: GP | Performed by: PHYSICAL THERAPIST

## 2019-12-27 ENCOUNTER — THERAPY VISIT (OUTPATIENT)
Dept: PHYSICAL THERAPY | Facility: CLINIC | Age: 19
End: 2019-12-27
Payer: COMMERCIAL

## 2019-12-27 DIAGNOSIS — M25.512 ACUTE PAIN OF LEFT SHOULDER: ICD-10-CM

## 2019-12-27 PROCEDURE — 97112 NEUROMUSCULAR REEDUCATION: CPT | Mod: GP | Performed by: PHYSICAL THERAPIST

## 2019-12-27 PROCEDURE — 97530 THERAPEUTIC ACTIVITIES: CPT | Mod: GP | Performed by: PHYSICAL THERAPIST

## 2020-01-22 ENCOUNTER — THERAPY VISIT (OUTPATIENT)
Dept: PHYSICAL THERAPY | Facility: CLINIC | Age: 20
End: 2020-01-22
Payer: COMMERCIAL

## 2020-01-22 DIAGNOSIS — M25.512 ACUTE PAIN OF LEFT SHOULDER: ICD-10-CM

## 2020-01-22 PROCEDURE — 97530 THERAPEUTIC ACTIVITIES: CPT | Mod: GP | Performed by: PHYSICAL THERAPIST

## 2020-01-22 PROCEDURE — 97112 NEUROMUSCULAR REEDUCATION: CPT | Mod: GP | Performed by: PHYSICAL THERAPIST

## 2020-02-19 ENCOUNTER — THERAPY VISIT (OUTPATIENT)
Dept: PHYSICAL THERAPY | Facility: CLINIC | Age: 20
End: 2020-02-19
Payer: COMMERCIAL

## 2020-02-19 DIAGNOSIS — M25.512 ACUTE PAIN OF LEFT SHOULDER: ICD-10-CM

## 2020-02-19 PROCEDURE — 97112 NEUROMUSCULAR REEDUCATION: CPT | Mod: GP | Performed by: PHYSICAL THERAPIST

## 2020-02-19 PROCEDURE — 97530 THERAPEUTIC ACTIVITIES: CPT | Mod: GP | Performed by: PHYSICAL THERAPIST

## 2020-03-16 ENCOUNTER — THERAPY VISIT (OUTPATIENT)
Dept: PHYSICAL THERAPY | Facility: CLINIC | Age: 20
End: 2020-03-16
Payer: COMMERCIAL

## 2020-03-16 DIAGNOSIS — M25.512 ACUTE PAIN OF LEFT SHOULDER: ICD-10-CM

## 2020-03-16 PROCEDURE — 97530 THERAPEUTIC ACTIVITIES: CPT | Mod: GP | Performed by: PHYSICAL THERAPIST

## 2020-03-16 PROCEDURE — 97112 NEUROMUSCULAR REEDUCATION: CPT | Mod: GP | Performed by: PHYSICAL THERAPIST

## 2020-03-16 NOTE — PROGRESS NOTES
PROGRESS  REPORT    Progress reporting period is from 11/6/19 to 3/16/20.       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 so she is not practicing or anything. pt played 2 weekends ago though. did have to lay out once and it did hurt her shoulder, felt like it subluxed again. this caused in increase in soreness again which is lingering. pt conserned d/t the continual subluxations she gets with higher level activity. prior to this exacerbation her HEP was going well, but had to lay low the last week d/t soreness again.     Current Pain level: (increased soreness d/t exacerbation).      Initial Pain level: 5/10.   Changes in function:  Yes, minimal  Adverse reaction to treatment or activity: activity - again, recurrent subluxation during ultimate frisbee    OBJECTIVE  Changes noted in objective findings:  None  Objective: long discussion with patient regarding status/function, lack of meeting goals with PT and continued instability events. session focused on POC, return to MD, possibly surgeon to discuss other options     ASSESSMENT/PLAN  Updated problem list and treatment plan: Diagnosis 1:  left shoulder pain following instability event, recurrent subluxations    Pain -  self management, education and home program  Decreased strength - therapeutic exercise, therapeutic activities and home program  Decreased proprioception - neuro re-education, therapeutic activities and home program  Decreased function - therapeutic activities and home program  Instability -  Therapeutic Activity  Therapeutic Exercise  Neuromuscular Re-education  home program  STG/LTGs have been met or progress has been made towards goals:  Yes (See Goal flow sheet completed today.), but limited progress lately  Assessment of Progress: The patient's condition is unchanged.  Self Management Plans:  Patient has been instructed in a home treatment program.  Patient  has been instructed in self management of symptoms.  I  have re-evaluated this patient and find that the nature, scope, duration and intensity of the therapy is appropriate for the medical condition of the patient.  Kim continues to require the following intervention to meet STG and LTG's:  Hold on formal PT for now to get further evaluation    Recommendations:  This patient would benefit from further evaluation.    Please refer to the daily flowsheet for treatment today, total treatment time and time spent performing 1:1 timed codes.

## 2020-03-16 NOTE — Clinical Note
Hi Dr. Don,  I began seeing Kim in November for shoulder instability that you had referred her for. She was making progress for awhile, but has now continued to have instability events even with participating in a thorough rehab program. 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 She is not progressing with PT, and we have discussed possibly needing further intervention. With COVID-19, her school season is cancelled, and this would be a good time for her to look into other options. She has not had any imaging other than the initial xray, and I am wondering if her next step would be MRI, and possible referral to meet with a surgeon. Would this be something you could order, or would you like to see her for follow up first?  I just am not sure how everything will be functioning with the virus changing everything.  Thank you for your referral and help, I can definitely be in touch with her to let hew know your thoughts/next step.  Thanks!  Jeane Cid, DPT, OCS  [facility]

## 2020-06-04 ENCOUNTER — OFFICE VISIT (OUTPATIENT)
Dept: ORTHOPEDICS | Facility: CLINIC | Age: 20
End: 2020-06-04
Payer: COMMERCIAL

## 2020-06-04 VITALS — WEIGHT: 130 LBS | BODY MASS INDEX: 22.2 KG/M2 | HEIGHT: 64 IN

## 2020-06-04 DIAGNOSIS — M25.312 INSTABILITY OF LEFT SHOULDER JOINT: Primary | ICD-10-CM

## 2020-06-04 DIAGNOSIS — Z11.59 ENCOUNTER FOR SCREENING FOR OTHER VIRAL DISEASES: Primary | ICD-10-CM

## 2020-06-04 ASSESSMENT — MIFFLIN-ST. JEOR: SCORE: 1339.68

## 2020-06-04 NOTE — PROGRESS NOTES
"      SPORTS & ORTHOPEDIC WALK-IN FOLLOW-UP VISIT 6/4/2020    Here today with persistent symptoms in her left shoulder.  She has done significant physical therapy with some improvement since we first met last fall.  However since that time she has had at least 5 episodes of instability where she feels her shoulder pop out of place and then clunk back into place.  She has been very diligent with her home exercises and reports that her shoulder feels strong and she fully recovers between these episodes, nonetheless they continue to occur with relatively minimal trauma.  More recently occurred while reaching for NGRAIN.      Interval History:     Follow up reason: continued pain in L shoulder    Date of injury: 9/28/19    Date last seen: 10/28/19    Following Therapeutic Plan: Yes     Pain: Unchanged    Function: Unchanged      Medical History:    Any recent changes to your medical history? No    Any new medication prescribed since last visit? No    Review of Systems:    Do you have fever, chills, weight loss? No    Do you have any vision problems? No    Do you have any chest pain or edema? No    Do you have any shortness of breath or wheezing?  No    Do you have stomach problems? No    Do you have any numbness or focal weakness? No    Do you have diabetes? No    Do you have problems with bleeding or clotting? No    Do you have an rashes or other skin lesions? No           Past Medical History, Current Medications, and Allergies are reviewed in the electronic medical record as appropriate.       EXAM:Ht 1.626 m (5' 4\")   Wt 59 kg (130 lb)   BMI 22.31 kg/m      EXAM:  Alert, pleasant and conversational    left shoulder:   Skin intact. No skin changes, deformity or atrophy    AROM: Forward Flexion 180 , Abduction 180 , External rotation approximately 70  Internal rotation to T7.    Strength testing: Abduction: 5/5, Abduction with 30  horizontal flexion and internal rotation: 5/5, External rotation: 5/5, Internal " rotation 5/5.   Deltoids 5/5, Biceps 5/5, Triceps 5/5,  5/5.    Palpation:deferred    Special Tests:   Positive apprehensive relocation    Neurovascularly intact bilateral upper extremities.      Imaging: no new imaging this visit      Assessment: Patient is a 19 year old female with recurrent left shoulder instability    Recommendations:   Reviewed assessment with patient  Recommended MRI with contrast to eval labrum  Will discuss results with patient via phone visit when available.     Joao Don MD

## 2020-06-09 DIAGNOSIS — Z11.59 ENCOUNTER FOR SCREENING FOR OTHER VIRAL DISEASES: ICD-10-CM

## 2020-06-09 PROCEDURE — 99207 ZZC NO BILLABLE SERVICE THIS VISIT: CPT

## 2020-06-09 PROCEDURE — 87635 SARS-COV-2 COVID-19 AMP PRB: CPT | Mod: 90 | Performed by: FAMILY MEDICINE

## 2020-06-09 PROCEDURE — 99000 SPECIMEN HANDLING OFFICE-LAB: CPT | Performed by: FAMILY MEDICINE

## 2020-06-09 NOTE — LETTER
June 12, 2020        Kim Gould [facility]  [address] [place] [address]    This letter provides a written record that you were tested for COVID-19 on 6/9/20.   Your result was negative.    This means that we didn t find the virus that causes COVID-19 in your sample. A test may show negative when you do actually have the virus. This can happen when the virus is in the early stages of infection, before you feel illness symptoms.    Even if you don t have symptoms, they may still appear. For safety, it s very important to follow these rules.    Keep yourself away from others (self-isolation):      Stay home. Don t go to work, school or anywhere else.     Stay in your own room (and use your own bathroom), if you can.    Stay away from others in your home. No hugging, kissing or shaking hands. No visitors.    Clean  high touch  surfaces often (doorknobs, counters, handles, etc.). Use a household cleaning spray or wipes.    Cover your mouth and nose with a mask, tissue or washcloth to avoid spreading germs.    Wash your hands and face often with soap and water.    Stay in self-isolation until you meet ALL of the guidelines below:    1. You have had no fever for at least 72 hours (that is 3 full days of no fever without the use of medicine that reduces fevers), AND  2. other symptoms (such as cough, shortness of breath) have gotten better, AND  3. at least 10 days have passed since your symptoms first appeared.    Going back to work  Check with your employer for any guidelines to follow for going back to work.    Employers: This document serves as formal notice that your employee tested negative for COVID-19, as of the testing date shown above.    For questions regarding this letter or your Negative COVID-19 result, call [phone] between 8A to 6:30P (M-F) and 10A to 6:30P (weekends).

## 2020-06-10 LAB
SARS-COV-2 RNA SPEC QL NAA+PROBE: NOT DETECTED
SPECIMEN SOURCE: NORMAL

## 2020-06-12 ENCOUNTER — HOSPITAL ENCOUNTER (OUTPATIENT)
Dept: MRI IMAGING | Facility: CLINIC | Age: 20
End: 2020-06-12
Attending: FAMILY MEDICINE
Payer: COMMERCIAL

## 2020-06-12 ENCOUNTER — HOSPITAL ENCOUNTER (OUTPATIENT)
Dept: GENERAL RADIOLOGY | Facility: CLINIC | Age: 20
End: 2020-06-12
Attending: FAMILY MEDICINE
Payer: COMMERCIAL

## 2020-06-12 VITALS — OXYGEN SATURATION: 100 % | DIASTOLIC BLOOD PRESSURE: 76 MMHG | SYSTOLIC BLOOD PRESSURE: 127 MMHG | HEART RATE: 84 BPM

## 2020-06-12 DIAGNOSIS — M25.312 INSTABILITY OF LEFT SHOULDER JOINT: ICD-10-CM

## 2020-06-12 PROCEDURE — 25000125 ZZHC RX 250: Performed by: FAMILY MEDICINE

## 2020-06-12 PROCEDURE — 25000128 H RX IP 250 OP 636: Performed by: FAMILY MEDICINE

## 2020-06-12 PROCEDURE — A9585 GADOBUTROL INJECTION: HCPCS | Performed by: FAMILY MEDICINE

## 2020-06-12 PROCEDURE — 73222 MRI JOINT UPR EXTREM W/DYE: CPT | Mod: LT

## 2020-06-12 PROCEDURE — 23350 INJECTION FOR SHOULDER X-RAY: CPT

## 2020-06-12 PROCEDURE — 25500064 ZZH RX 255 OP 636: Performed by: FAMILY MEDICINE

## 2020-06-12 RX ORDER — IOPAMIDOL 408 MG/ML
10 INJECTION, SOLUTION INTRATHECAL ONCE
Status: COMPLETED | OUTPATIENT
Start: 2020-06-12 | End: 2020-06-12

## 2020-06-12 RX ORDER — EPINEPHRINE 1 MG/ML
0.1 INJECTION, SOLUTION, CONCENTRATE INTRAVENOUS ONCE
Status: COMPLETED | OUTPATIENT
Start: 2020-06-12 | End: 2020-06-12

## 2020-06-12 RX ORDER — GADOBUTROL 604.72 MG/ML
0.1 INJECTION INTRAVENOUS ONCE
Status: COMPLETED | OUTPATIENT
Start: 2020-06-12 | End: 2020-06-12

## 2020-06-12 RX ADMIN — IOPAMIDOL 1.5 ML: 408 INJECTION, SOLUTION INTRATHECAL at 15:42

## 2020-06-12 RX ADMIN — EPINEPHRINE 1 MG: 1 INJECTION, SOLUTION, CONCENTRATE INTRAVENOUS at 15:43

## 2020-06-12 RX ADMIN — GADOBUTROL 0.1 ML: 604.72 INJECTION INTRAVENOUS at 15:43

## 2020-06-12 RX ADMIN — LIDOCAINE HYDROCHLORIDE 2.5 ML: 10 INJECTION, SOLUTION EPIDURAL; INFILTRATION; INTRACAUDAL; PERINEURAL at 15:41

## 2020-06-12 NOTE — IP AVS SNAPSHOT
[facility]  [address] [address]  Phone:  [phone]                                    After Visit Summary   6/12/2020    Kim Jenkins    MRN: [id number]           After Visit Summary Signature Page    I have received my discharge instructions, and my questions have been answered. I have discussed any challenges I see with this plan with the nurse or doctor.    ..........................................................................................................................................  Patient/Patient Representative Signature      ..........................................................................................................................................  Patient Representative Print Name and Relationship to Patient    ..................................................               ................................................  Date                                   Time    ..........................................................................................................................................  Reviewed by Signature/Title    ...................................................              ..............................................  Date                                               Time          22EPIC Rev 08/18

## 2020-06-12 NOTE — IP AVS SNAPSHOT
MRN:[id number]                      After Visit Summary   6/12/2020    Kim Jenkins    MRN: [id number]           Visit Information        Provider Department      6/12/2020  3:00 PM [facility] RAD; SHXR4 [facility] Radiology           Review of your medicines      UNREVIEWED medicines. Ask your doctor about these medicines       Dose / Directions   * Aubra 0.1-20 MG-MCG tablet  Generic drug:  levonorgestrel-ethinyl estradiol      Refills:  0     * Chateal EQ 0.15-30 MG-MCG tablet  Generic drug:  levonorgestrel-ethinyl estradiol      Refills:  0     diclofenac 75 MG EC tablet  Commonly known as:  VOLTAREN  Used for:  Rhomboid muscle strain, initial encounter      Dose:  75 mg  Take 1 tablet (75 mg) by mouth 2 times daily  Quantity:  20 tablet  Refills:  0     EContra EZ 1.5 MG tablet  Generic drug:  levonorgestrel      Refills:  0     SF 5000 Plus 1.1 % Crea  Generic drug:  Sodium Fluoride      Refills:  0     tiZANidine 4 MG tablet  Commonly known as:  ZANAFLEX  Used for:  Rhomboid muscle strain, initial encounter      Dose:  4 mg  Take 1 tablet (4 mg) by mouth At Bedtime  Quantity:  10 tablet  Refills:  0         * This list has 2 medication(s) that are the same as other medications prescribed for you. Read the directions carefully, and ask your doctor or other care provider to review them with you.                  Protect others around you: Learn how to safely use, store and throw away your medicines at www.disposemymeds.org.       Follow-ups after your visit       Your next 10 appointments already scheduled    Jun 12, 2020  3:00 PM CDT  XR SHOULDER CONTRAST CT/MR INJECTION with SHXR4, [facility]ASQUE[facility] RAD  [facility] Radiology ([facility]) [address] [place] [id number] [phone]   How do I prepare for my exam? (Food and drink instructions)  No Food and Drink Restrictions.    What should I wear: Wear comfortable clothes.    What should I  bring: Please bring any scans or X-rays taken at other hospitals, if similar tests were done. Also bring a list of your medicines, including vitamins, minerals and over-the-counter drugs. It is safest to leave personal items at home.    Do I need a :  No  is needed.    What do I need to tell my doctor:  Tell your doctor in advance:  * If you are allergic to X-ray dye (contrast fluid).  * If you may be pregnant.    What is this test: An arthrogram is an X-ray exam of a joint. We will take a set of X-ray pictures. We will then inject dye (contrast fluid) into the area around the joint. After that, we will take another set of X-ray pictures. The dye helps your doctor see the joint better on the X-rays.    Who should I call with questions: If you have any questions, please call the Imaging Department where you will have your exam. Directions, parking instructions, and other information are available on our website, [email or web address].     Jun 12, 2020  4:00 PM CDT  MR SHOULDER ARTHROGRAM  LEFT W CONTRAST with SHMRP1  [facility] ([facility]) [address] [place] [id number] [phone]   How do I prepare for my exam? (Food and drink instructions)  **If you will be receiving sedation or general anesthesia, please see special notes below.**    How do I prepare for my exam? (Other instructions)  Take your medicines as usual, unless your doctor tells you not to.  You may or may not receive intravenous (IV) contrast for this exam pending the discretion of the Radiologist.  You do not need to do anything special to prepare.    **If you will be receiving sedation or general anesthesia, please see special notes below.**    What should I wear: The MRI machine uses a strong magnet. Due to increased risk of metallic materials/threading in clothing, for your safety, you will be requested to change into a hospital gown. Please remove any body piercings and hair extensions  before you arrive. You will also remove watches, jewelry, hairpins, wallets, dentures, partial dental plates and hearing aids. You may wear contact lenses, and you may be able to wear your rings. We have a safe place to keep your personal items, but it is safer to leave them at home.    How long does the exam take: Most tests take 30 to 60 minutes.  HOWEVER, IF YOUR DOCTOR PRESCRIBES ANESTHESIA please plan on spending four to five hours in the recovery room.    What should I bring:  Bring a list of your current medicines to your exam (including vitamins, minerals and over-the-counter drugs).  If you are a minor (under age 18) you will need to bring a parent or legal guardian with you to the exam.    Do I need a :  **If you will be receiving sedation or general anesthesia, please see special notes below.**    What should I do after the exam: No restrictions, you may resume normal activities.    What is this test: MRI (magnetic resonance imaging) uses a strong magnet and radio waves to look inside the body. An MRA (magnetic resonance angiogram) does the same thing, but it lets us look at your blood vessels. A computer turns the radio waves into pictures showing cross sections of the body, much like slices of bread. This helps us see any problems more clearly. You may receive fluid (called  contrast ) before or during your scan. The fluid helps us see the pictures better. We give the fluid through an IV (small needle in your arm).    Who should I call with questions:  Please call the Imaging Department at your exam site with any questions. Directions, parking instructions, and other information are available on our website, [email or web address].    How do I prepare if I m having sedation or anesthesia?  **IMPORTANT**  THE INSTRUCTIONS BELOW ARE ONLY FOR THOSE PATIENTS WHO HAVE BEEN TOLD THEY WILL RECEIVE SEDATION OR GENERAL ANESTHESIA DURING THEIR MRI PROCEDURE:    IF YOU WILL RECEIVE ORAL SEDATION (take medicine  by mouth to help you relax during your exam):  You must get the medicine from your doctor before you arrive. Bring the medicine to the exam. Do not take it at home.  Arrive one hour early with a . Your  must remain on site for the duration of the exam. Your medicine may make you sleepy. After the exam, you may not drive, take a bus or take a taxi by yourself.    IF YOU WILL RECEIVE SEDATION WITH AN IV OR ANESTHESIA (deeper level of sedation ordered and administered by a health professional):  Arrive 1 1/2 hours early with a . Your  must remain on site for the duration of the exam. Your medicine may make you sleepy. After the exam, you may not drive, take a bus or take a taxi by yourself.  No eating 8 hours before your exam. You may have clear liquids up until 4 hours before your exam. (Clear liquids include water, clear tea, black coffee and fruit juice without pulp.)  You may spend up to four to five hours in the recovery room.     Jun 13, 2020 10:00 AM CDT  Telephone Visit with Joao Don MD  [facility] ([facility]) [address] [place] [id number] [phone]   [facility]  Note: this is not an onsite visit; there is no need to come to the facility.  Please have a list of all current medications available for appointment.         Care Instructions       Further instructions from your care team         Orthopedic Discharge Instructions:   After Your Injection or Aspiration  ________________________________________    Patient Name:  Kim Jenkins  Today's Date:  June 12, 2020  The provider who performed your left shoulder injection was Daren Sorensen at [facility]  in the  General Radiology Department  Care of needle site    Over the next 24 to 48 hours, pain at the needle site may increase before it gets better.      For the next 48 hours, use ice packs for 15 minutes, three to four times a day for pain.    If you have a bandage, you may remove it the next morning.     No tub baths, hot tubs or swimming for 48 hours. You may shower the next day.         Activity    Limit your activity based on your pain level. Follow your doctor s orders for activity.     You may eat a normal diet.     Medicines    If you take aspirin or platelet inhibitors, you can restart them tomorrow.     Restart all other medicines today at your regular dose, including Coumadin (warfarin).    If you are restarting Coumadin, talk to your doctor about having your INR checked.     Follow-up:  Follow up with your ordering provider     Call your doctor or go to the Emergency Room if you have severe pain, fever or problems with bowel or bladder control.     Additional Information About Your Visit       MyChart Information    [employer] gives you secure access to your electronic health record. If you see a primary care provider, you can also send messages to your care team and make appointments. If you have questions, please call your primary care clinic.  If you do not have a primary care provider, please call [phone] and they will assist you.       Care EveryWhere ID    This is your Care EveryWhere ID. This could be used by other organizations to access your [facility] medical records  [id number]        Primary Care Provider Fax #    Physician No Ref-Primary [phone]      Equal Access to Services    SARAH AVITIA : Rahel normano Sonatalia, waaxda luqadaha, qaybta kaalmada adeegyada, dafne elizabeth . So [facility] [phone].    ATENCIÓN: Si habla español, tiene a escobar disposición servicios gratuitos de asistencia lingüística. Llame al [phone].    We comply with applicable federal and state civil rights laws, including the [place] Human Rights Act. We do not discriminate on the basis of race, color, creed, Mormon,  national origin, marital status, age, disability, sex, sexual orientation, or gender identity.       Thank you!    Thank you for choosing [facility] for your care. Our goal is always to provide you with excellent care. Hearing back from our patients is one way we can continue to improve our services. Please take a few minutes to complete the written survey that you may receive in the mail after you visit with us. Thank you!            Medication List      ASK your doctor about these medications          Morning Afternoon Evening Bedtime As Needed    * Aubra 0.1-20 MG-MCG tablet  Generic drug:  levonorgestrel-ethinyl estradiol                     * Chateal EQ 0.15-30 MG-MCG tablet  Generic drug:  levonorgestrel-ethinyl estradiol                     diclofenac 75 MG EC tablet  Also known as:  VOLTAREN  INSTRUCTIONS:  Take 1 tablet (75 mg) by mouth 2 times daily                     EContra EZ 1.5 MG tablet  Generic drug:  levonorgestrel                     SF 5000 Plus 1.1 % Crea  Generic drug:  Sodium Fluoride                     tiZANidine 4 MG tablet  Also known as:  ZANAFLEX  INSTRUCTIONS:  Take 1 tablet (4 mg) by mouth At Bedtime                        * This list has 2 medication(s) that are the same as other medications prescribed for you. Read the directions carefully, and ask your doctor or other care provider to review them with you.

## 2020-06-12 NOTE — DISCHARGE INSTRUCTIONS
Orthopedic Discharge Instructions:   After Your Injection or Aspiration  ________________________________________    Patient Name:  Kim Jenkins  Today's Date:  June 12, 2020  The provider who performed your left shoulder injection was Daren Sorensen at [facility]  in the  General Radiology Department  Care of needle site    Over the next 24 to 48 hours, pain at the needle site may increase before it gets better.     For the next 48 hours, use ice packs for 15 minutes, three to four times a day for pain.    If you have a bandage, you may remove it the next morning.     No tub baths, hot tubs or swimming for 48 hours. You may shower the next day.         Activity    Limit your activity based on your pain level. Follow your doctor s orders for activity.     You may eat a normal diet.     Medicines    If you take aspirin or platelet inhibitors, you can restart them tomorrow.     Restart all other medicines today at your regular dose, including Coumadin (warfarin).    If you are restarting Coumadin, talk to your doctor about having your INR checked.     Follow-up:  Follow up with your ordering provider     Call your doctor or go to the Emergency Room if you have severe pain, fever or problems with bowel or bladder control.

## 2020-06-12 NOTE — PROGRESS NOTES
RADIOLOGY PROCEDURE NOTE  Patient name: Kim Jenkins  MRN: [id number]  : 2000    Pre-procedure diagnosis: Left shoulder pain  Post-procedure diagnosis: Same    Procedure Date/Time: 2020  3:52 PM  Procedure: Left shoulder gadolinium injection for arthrogram  Estimated blood loss: None  Specimen(s) collected with description: none  The patient tolerated the procedure well with no immediate complications.  Significant findings:none    See imaging dictation for procedural details.    Provider name: Daren Sorensen PA-C  Assistant(s):None

## 2020-06-13 ENCOUNTER — VIRTUAL VISIT (OUTPATIENT)
Dept: ORTHOPEDICS | Facility: CLINIC | Age: 20
End: 2020-06-13
Payer: COMMERCIAL

## 2020-06-13 DIAGNOSIS — M25.312 INSTABILITY OF LEFT SHOULDER JOINT: Primary | ICD-10-CM

## 2020-06-13 NOTE — LETTER
Date:June 24, 2020      Patient was self referred, no letter generated. Do not send.        [facility] Information

## 2020-06-13 NOTE — LETTER
"    6/13/2020         RE: Kim Jenkins  [address] [place] [address]        Dear Colleague,    Thank you for referring your patient, Kim Jenkins, to the [facility]. Please see a copy of my visit note below.    Kim Jenkins is a 19 year old female who is being evaluated via a billable telephone visit.      The patient has been notified of following:     \"This telephone visit will be conducted via a call between you and your physician/provider. We have found that certain health care needs can be provided without the need for a physical exam.  This service lets us provide the care you need with a short phone conversation.  If a prescription is necessary we can send it directly to your pharmacy.  If lab work is needed we can place an order for that and you can then stop by our lab to have the test done at a later time.    Telephone visits are billed at different rates depending on your insurance coverage. During this emergency period, for some insurers they may be billed the same as an in-person visit.  Please reach out to your insurance provider with any questions.    If during the course of the call the physician/provider feels a telephone visit is not appropriate, you will not be charged for this service.\"    Patient has given verbal consent for Telephone visit?  Yes    What phone number would you like to be contacted at? [phone]    How would you like to obtain your AVS? Hammad    Reviewed MRI results with the patient over the phone.  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  At this point time given her recurrent instability since September despite very dedicated effort with physical therapy and home exercise have recommended follow-up with shoulder specialist for discussion of surgical options.  A referral was placed.  She will contact us if she has any difficulty scheduling or " questions prior to follow-up.    Phone call duration: 5 minutes    Joao Don MD      Again, thank you for allowing me to participate in the care of your patient.        Sincerely,        Joao Don MD

## 2020-06-17 ENCOUNTER — TELEPHONE (OUTPATIENT)
Dept: ORTHOPEDICS | Facility: CLINIC | Age: 20
End: 2020-06-17

## 2020-06-17 NOTE — TELEPHONE ENCOUNTER
I left a message for the patient letting her know I was calling to schedule an in person appointment with Dr. Benítez or Dr. Sidhu. A call back number was left.     LOUIS Garrison

## 2021-01-03 ENCOUNTER — HEALTH MAINTENANCE LETTER (OUTPATIENT)
Age: 21
End: 2021-01-03

## 2021-01-28 ENCOUNTER — IMMUNIZATION (OUTPATIENT)
Dept: PEDIATRICS | Facility: CLINIC | Age: 21
End: 2021-01-28
Payer: COMMERCIAL

## 2021-01-28 PROCEDURE — 91300 PR COVID VAC PFIZER DIL RECON 30 MCG/0.3 ML IM: CPT

## 2021-01-28 PROCEDURE — 0001A PR COVID VAC PFIZER DIL RECON 30 MCG/0.3 ML IM: CPT

## 2021-02-18 ENCOUNTER — IMMUNIZATION (OUTPATIENT)
Dept: PEDIATRICS | Facility: CLINIC | Age: 21
End: 2021-02-18
Attending: INTERNAL MEDICINE
Payer: COMMERCIAL

## 2021-02-18 PROCEDURE — 0002A PR COVID VAC PFIZER DIL RECON 30 MCG/0.3 ML IM: CPT

## 2021-02-18 PROCEDURE — 91300 PR COVID VAC PFIZER DIL RECON 30 MCG/0.3 ML IM: CPT

## 2021-03-19 PROBLEM — M25.512 ACUTE PAIN OF LEFT SHOULDER: Status: RESOLVED | Noted: 2019-11-06 | Resolved: 2021-03-19

## 2021-03-19 NOTE — PROGRESS NOTES
Please see Progress Note from the last visit on 3/16/20 for discharge information as patient did not return to Physical therapy to complete her Plan of Care. Patient will be discharged from formal physical therapy at this time.

## 2021-10-10 ENCOUNTER — HEALTH MAINTENANCE LETTER (OUTPATIENT)
Age: 21
End: 2021-10-10

## 2022-01-29 ENCOUNTER — HEALTH MAINTENANCE LETTER (OUTPATIENT)
Age: 22
End: 2022-01-29

## 2022-09-18 ENCOUNTER — HEALTH MAINTENANCE LETTER (OUTPATIENT)
Age: 22
End: 2022-09-18

## 2023-05-06 ENCOUNTER — HEALTH MAINTENANCE LETTER (OUTPATIENT)
Age: 23
End: 2023-05-06

## (undated) RX ORDER — EPINEPHRINE 1 MG/ML
INJECTION, SOLUTION, CONCENTRATE INTRAVENOUS
Status: DISPENSED
Start: 2020-06-12

## (undated) RX ORDER — LIDOCAINE HYDROCHLORIDE 10 MG/ML
INJECTION, SOLUTION EPIDURAL; INFILTRATION; INTRACAUDAL; PERINEURAL
Status: DISPENSED
Start: 2020-06-12